# Patient Record
Sex: MALE | Race: BLACK OR AFRICAN AMERICAN | Employment: OTHER | URBAN - METROPOLITAN AREA
[De-identification: names, ages, dates, MRNs, and addresses within clinical notes are randomized per-mention and may not be internally consistent; named-entity substitution may affect disease eponyms.]

---

## 2017-07-07 DIAGNOSIS — I15.9 SECONDARY HYPERTENSION: ICD-10-CM

## 2017-07-07 RX ORDER — LISINOPRIL 20 MG/1
20 TABLET ORAL DAILY
Qty: 30 TAB | Refills: 1 | OUTPATIENT
Start: 2017-07-07

## 2017-07-11 RX ORDER — LISINOPRIL 20 MG/1
20 TABLET ORAL DAILY
Qty: 30 TAB | Refills: 1 | Status: CANCELLED | OUTPATIENT
Start: 2017-07-11

## 2017-07-11 NOTE — TELEPHONE ENCOUNTER
Verito Grady, wife, is requesting a refill for \"Lisinopril\" (mg unknown at this time). Pt is completely out, and uses the Ruba Perez on file.  Best contact [phone].              Copied/pasted from [facility]

## 2017-07-14 ENCOUNTER — TELEPHONE (OUTPATIENT)
Dept: INTERNAL MEDICINE CLINIC | Age: 57
End: 2017-07-14

## 2017-07-14 NOTE — TELEPHONE ENCOUNTER
the patient is no longer under my care    See LOV note    When I saw him in November (over 6 months ago) we discussed together that he would no longer be following with me d/t differences in philosophy of care.     I would continue scripts for 1 month , we are way past that point in time      I no longer prescribe for him

## 2017-07-14 NOTE — TELEPHONE ENCOUNTER
Spoke to Maritza Michael (hospitals). Romi Reveles states that pt is currently looking for another pcp but would like a refill on lisinopril. Romi Sat informed pt is way overdue and being that he is looking for another physician, more than likely no refill will be done. Romi Sat asking for a letter stating why be sent to pt's address. Romi Sat informed Dr. Elba Thakur will be notified. Romi Reveles verbalized understanding of information discussed w/ no further questions at this time.

## 2017-07-27 ENCOUNTER — OFFICE VISIT (OUTPATIENT)
Dept: INTERNAL MEDICINE CLINIC | Age: 57
End: 2017-07-27

## 2017-07-27 VITALS
DIASTOLIC BLOOD PRESSURE: 136 MMHG | TEMPERATURE: 99.1 F | BODY MASS INDEX: 34.33 KG/M2 | OXYGEN SATURATION: 98 % | SYSTOLIC BLOOD PRESSURE: 182 MMHG | WEIGHT: 239.8 LBS | HEART RATE: 97 BPM | HEIGHT: 70 IN

## 2017-07-27 DIAGNOSIS — K92.1 HEMATOCHEZIA: ICD-10-CM

## 2017-07-27 DIAGNOSIS — I15.9 SECONDARY HYPERTENSION: ICD-10-CM

## 2017-07-27 DIAGNOSIS — I10 ESSENTIAL HYPERTENSION: ICD-10-CM

## 2017-07-27 DIAGNOSIS — K59.00 CONSTIPATION, UNSPECIFIED CONSTIPATION TYPE: Primary | ICD-10-CM

## 2017-07-27 RX ORDER — HYDROCHLOROTHIAZIDE 25 MG/1
25 TABLET ORAL DAILY
Qty: 30 TAB | Refills: 1 | Status: SHIPPED | OUTPATIENT
Start: 2017-07-27 | End: 2020-06-15 | Stop reason: SDUPTHER

## 2017-07-27 RX ORDER — ECONAZOLE NITRATE 10 MG/G
CREAM TOPICAL 2 TIMES DAILY
Qty: 15 G | Refills: 0 | Status: SHIPPED | OUTPATIENT
Start: 2017-07-27 | End: 2020-06-15 | Stop reason: SDUPTHER

## 2017-07-27 RX ORDER — LISINOPRIL 20 MG/1
20 TABLET ORAL DAILY
Qty: 30 TAB | Refills: 1 | Status: SHIPPED | OUTPATIENT
Start: 2017-07-27 | End: 2019-05-15 | Stop reason: SDUPTHER

## 2017-07-27 NOTE — PROGRESS NOTES
HISTORY OF PRESENT ILLNESS  Royal Chavira is a 49 y.o. male. HPI   C/o constipation with hard small pebble like stool x 5 days  Senna tea not helping and fleets suppositry   C/o sharp pain in lower abdomen last few days  No rectal or bleeding this week but had large blood in rectum last few months. 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 30 Tab 1    lisinopril (PRINIVIL, ZESTRIL) 20 mg tablet Take 1 Tab by mouth daily. 30 Tab 1    econazole nitrate (SPECTAZOLE) 1 % topical cream Apply  to affected area two (2) times a day. 15 g 0    sildenafil citrate (VIAGRA) 100 mg tablet Take 1 Tab by mouth as needed. 6 Tab 0    hydrocortisone (ANUSOL-HC) 25 mg suppository Insert 1 Suppository into rectum two (2) times daily as needed for Hemorrhoids.  60 Each 4    Blood-Glucose Meter (FREESTYLE FREEDOM LITE) monitoring kit Check blood sugar daily 1 Kit 0    glucose blood VI test strips (FREESTYLE LITE STRIPS) strip Check blood sugar daily 25 Strip 0    chlorhexidine (HIBICLENS) 4 % liquid 3 times per week from neck down 1 Bottle 0    pravastatin (PRAVACHOL) 20 mg tablet Take 1 Tab by mouth nightly. 30 Tab 6    clotrimazole (LOTRIMIN) 1 % topical cream Apply  to affected area two (2) times a day. 45 g 2     Allergies   Allergen Reactions    Hydrocodone Nausea Only    Naprosyn [Naproxen] Nausea and Vomiting      Lab Results  Component Value Date/Time   WBC 7.6 05/08/2014 02:06 PM   HGB 13.6 05/08/2014 02:06 PM   HCT 38.7 05/08/2014 02:06 PM   PLATELET 552 52/40/4546 02:06 PM   MCV 87.6 05/08/2014 02:06 PM     Lab Results  Component Value Date/Time   GFR est non-AA 55 10/05/2015 03:26 PM   GFR est AA 64 10/05/2015 03:26 PM   Creatinine 1.44 10/05/2015 03:26 PM   BUN 12 10/05/2015 03:26 PM   Sodium 140 10/05/2015 03:26 PM   Potassium 4.4 10/05/2015 03:26 PM   Chloride 100 10/05/2015 03:26 PM   CO2 23 10/05/2015 03:26 PM        ROS    Physical Exam   Constitutional: He appears well-developed and well-nourished. No distress. Appears stated age   HENT:   Head: Normocephalic. Cardiovascular: Normal rate, regular rhythm and normal heart sounds. Exam reveals no gallop and no friction rub. No murmur heard. Pulmonary/Chest: Effort normal and breath sounds normal.   Abdominal: Soft. He exhibits no mass. There is no tenderness. There is no rebound and no guarding. Genitourinary: Prostate normal.   Genitourinary Comments: Smooth  1plus prostate, no rectal masses or stool in rectum palptated   Musculoskeletal: He exhibits no edema. Neurological: He is alert. Psychiatric: He has a normal mood and affect. Nursing note and vitals reviewed. ASSESSMENT and PLAN  Diagnoses and all orders for this visit:    1. Constipation, unspecified constipation type  -     REFERRAL TO GASTROENTEROLOGY  -     CBC W/O DIFF  -     METABOLIC PANEL, COMPREHENSIVE   miralax bid   Can use mag citrate if above ineffective    2. Hematochezia  -     REFERRAL TO GASTROENTEROLOGY  -     CBC W/O DIFF  -     METABOLIC PANEL, COMPREHENSIVE    3. Secondary hypertension  -     lisinopril (PRINIVIL, ZESTRIL) 20 mg tablet;  Take 1 Tab by mouth daily. Stressed medcomplinace   F/u PCP in 1-2 months    4. Essential hypertension    Other orders  -     hydroCHLOROthiazide (HYDRODIURIL) 25 mg tablet; Take 1 Tab by mouth daily. -     econazole nitrate (SPECTAZOLE) 1 % topical cream; Apply  to affected area two (2) times a day. Follow-up Disposition:  Return in about 2 months (around 9/27/2017) for Dr Rao Do HTN constipation.

## 2017-07-27 NOTE — MR AVS SNAPSHOT
Visit Information Date & Time Provider Department Dept. Phone Encounter #  
 7/27/2017  1:45 PM Giuliano Kaplan [facility] [address] [phone] [id number] Follow-up Instructions Return in about 2 months (around 9/27/2017) for Dr Mary Preston HTN constipation. Upcoming Health Maintenance Date Due DTaP/Tdap/Td series (1 - Tdap) 12/26/1983 INFLUENZA AGE 9 TO ADULT 8/1/2017 COLONOSCOPY 10/5/2025 Allergies as of 7/27/2017  Review Complete On: 7/27/2017 By: Ashley Orozco LPN Severity Noted Reaction Type Reactions Hydrocodone  03/21/2011    Nausea Only Naprosyn [Naproxen]  05/08/2014    Nausea and Vomiting Current Immunizations  Never Reviewed No immunizations on file. Not reviewed this visit You Were Diagnosed With   
  
 Codes Comments Constipation, unspecified constipation type    -  Primary ICD-10-CM: K59.00 ICD-9-CM: 564.00 Hematochezia     ICD-10-CM: K92.1 ICD-9-CM: 578.1 Secondary hypertension     ICD-10-CM: I15.9 ICD-9-CM: 405.99 Vitals BP Pulse Temp Height(growth percentile) Weight(growth percentile) SpO2  
 (!) 182/136 (BP 1 Location: Left arm, BP Patient Position: Sitting) 97 99.1 °F (37.3 °C) (Oral) 5' 10\" (1.778 m) 239 lb 12.8 oz (108.8 kg) 98% BMI Smoking Status 34.41 kg/m2 Former Smoker BMI and BSA Data Body Mass Index Body Surface Area 34.41 kg/m 2 2.32 m 2 Preferred Pharmacy Pharmacy Name Phone [facility] [address] [facility] [phone] Your Updated Medication List  
  
   
This list is accurate as of: 7/27/17  2:38 PM.  Always use your most recent med list.  
  
  
  
  
 Blood-Glucose Meter monitoring kit Commonly known as:  FREESTYLE FREEDOM LITE Check blood sugar daily  
  
 chlorhexidine 4 % liquid Commonly known as:  HIBICLENS  
3 times per week from neck down clotrimazole 1 % topical cream  
Commonly known as:  Geraline Chill Apply  to affected area two (2) times a day.  
  
 econazole nitrate 1 % topical cream  
Commonly known as:  Diania Lapping Apply  to affected area two (2) times a day. glucose blood VI test strips strip Commonly known as:  FREESTYLE LITE STRIPS Check blood sugar daily  
  
 hydroCHLOROthiazide 25 mg tablet Commonly known as:  HYDRODIURIL Take 1 Tab by mouth daily. hydrocortisone 25 mg Supp Commonly known as:  ANUSOL-HC Insert 1 Suppository into rectum two (2) times daily as needed for Hemorrhoids. lisinopril 20 mg tablet Commonly known as:  Tildon Kota Take 1 Tab by mouth daily. pravastatin 20 mg tablet Commonly known as:  PRAVACHOL Take 1 Tab by mouth nightly. sildenafil citrate 100 mg tablet Commonly known as:  VIAGRA Take 1 Tab by mouth as needed. Prescriptions Sent to Pharmacy Refills  
 hydroCHLOROthiazide (HYDRODIURIL) 25 mg tablet 1 Sig: Take 1 Tab by mouth daily. Class: Normal  
 Pharmacy: [facility] [address] [place] [address] Ph #: [phone] Route: Oral  
 lisinopril (PRINIVIL, ZESTRIL) 20 mg tablet 1 Sig: Take 1 Tab by mouth daily. Class: Normal  
 Pharmacy: [facility] [address] [place] [address] Ph #: [phone] Route: Oral  
 econazole nitrate (SPECTAZOLE) 1 % topical cream 0 Sig: Apply  to affected area two (2) times a day. Class: Normal  
 Pharmacy: [facility] [id number] [facility] Ph #: [phone] Route: Topical  
  
We Performed the Following CBC W/O DIFF [18324 CPT(R)] METABOLIC PANEL, COMPREHENSIVE [85007 CPT(R)] REFERRAL TO GASTROENTEROLOGY [OQR02 Custom] Comments:  
 Please evaluate patient for hematochezia Follow-up Instructions Return in about 2 months (around 9/27/2017) for Dr Tuyet Goetz HTN constipation. Referral Information Referral ID Referred By Referred To  
  
 [id number] KO, [address]   
   [address] [place], [address] Visits Status Start Date End Date 1 New Request 7/27/17 7/27/18 If your referral has a status of pending review or denied, additional information will be sent to support the outcome of this decision. Introducing [facility]! [employer] introduces Walk-in Appointment Scheduler patient portal. Now you can access parts of your medical record, email your doctor's office, and request medication refills online. 1. In your internet browser, go to [email or web address] 2. Click on the First Time User? Click Here link in the Sign In box. You will see the New Member Sign Up page. 3. Enter your Walk-in Appointment Scheduler Access Code exactly as it appears below. You will not need to use this code after youve completed the sign-up process. If you do not sign up before the expiration date, you must request a new code. · Walk-in Appointment Scheduler Access Code: [email or web address] Expires: 10/25/2017  2:38 PM 
 
4. Enter the last four digits of your Social Security Number (xxxx) and Date of Birth (mm/dd/yyyy) as indicated and click Submit. You will be taken to the next sign-up page. 5. Create a Walk-in Appointment Scheduler ID. This will be your Walk-in Appointment Scheduler login ID and cannot be changed, so think of one that is secure and easy to remember. 6. Create a Walk-in Appointment Scheduler password. You can change your password at any time. 7. Enter your Password Reset Question and Answer. This can be used at a later time if you forget your password. 8. Enter your e-mail address. You will receive e-mail notification when new information is available in [address] 9. Click Sign Up. You can now view and download portions of your medical record.  
10. Click the Download Summary menu link to download a portable copy of your medical information. If you have questions, please visit the Frequently Asked Questions section of the [employer] website. Remember, [employer] is NOT to be used for urgent needs. For medical emergencies, dial 911. Now available from your iPhone and Android! Please provide this summary of care documentation to your next provider. If you have any questions after today's visit, please call [phone].

## 2017-07-28 LAB
ALBUMIN SERPL-MCNC: 4.5 G/DL (ref 3.5–5.5)
ALBUMIN/GLOB SERPL: 1.3 {RATIO} (ref 1.2–2.2)
ALP SERPL-CCNC: 58 IU/L (ref 39–117)
ALT SERPL-CCNC: 19 IU/L (ref 0–44)
AST SERPL-CCNC: 20 IU/L (ref 0–40)
BILIRUB SERPL-MCNC: 0.5 MG/DL (ref 0–1.2)
BUN SERPL-MCNC: 12 MG/DL (ref 6–24)
BUN/CREAT SERPL: 7 (ref 9–20)
CALCIUM SERPL-MCNC: 9.8 MG/DL (ref 8.7–10.2)
CHLORIDE SERPL-SCNC: 95 MMOL/L (ref 96–106)
CO2 SERPL-SCNC: 20 MMOL/L (ref 18–29)
CREAT SERPL-MCNC: 1.61 MG/DL (ref 0.76–1.27)
ERYTHROCYTE [DISTWIDTH] IN BLOOD BY AUTOMATED COUNT: 15.3 % (ref 12.3–15.4)
GLOBULIN SER CALC-MCNC: 3.5 G/DL (ref 1.5–4.5)
GLUCOSE SERPL-MCNC: 117 MG/DL (ref 65–99)
HCT VFR BLD AUTO: 44.5 % (ref 37.5–51)
HGB BLD-MCNC: 15.3 G/DL (ref 12.6–17.7)
MCH RBC QN AUTO: 31.2 PG (ref 26.6–33)
MCHC RBC AUTO-ENTMCNC: 34.4 G/DL (ref 31.5–35.7)
MCV RBC AUTO: 91 FL (ref 79–97)
PLATELET # BLD AUTO: 273 X10E3/UL (ref 150–379)
POTASSIUM SERPL-SCNC: 3.7 MMOL/L (ref 3.5–5.2)
PROT SERPL-MCNC: 8 G/DL (ref 6–8.5)
RBC # BLD AUTO: 4.91 X10E6/UL (ref 4.14–5.8)
SODIUM SERPL-SCNC: 137 MMOL/L (ref 134–144)
WBC # BLD AUTO: 10.6 X10E3/UL (ref 3.4–10.8)

## 2017-07-28 NOTE — PROGRESS NOTES
Tell pt is cbc is wnl--not anemic, but should still see the GI MD for colonsocopy d/t rectal bleeding and abdominal pain. His kidney function in moderately impaired--CKD stage 3--likely has impaired kidney function due to HTN.  Refer pt to renal MD Dr Sonya Rosario for CKD and uncontrolled HTN

## 2017-07-31 NOTE — PROGRESS NOTES
Spoke with patient after 2 patient identifiers being note and advised that his cbc is wnl and that he is not anemic, but should still see the GI MD for colonsocopy d/t rectal bleeding and abdominal pain. Also advised that his kidney function in moderately impaired, which likely has to do with impaired kidney function due to HTN. Refer pt to renal MD Dr Sonya Rosario for CKD and uncontrolled HTN. Pt states that he would like to wait on referrals at this time. All per Dr. Ivan Rocha. Patient expressed understanding and has no further questions at this time.

## 2017-08-10 ENCOUNTER — TELEPHONE (OUTPATIENT)
Dept: INTERNAL MEDICINE CLINIC | Age: 57
End: 2017-08-10

## 2017-08-10 NOTE — TELEPHONE ENCOUNTER
Wife states there was to have been a suppository called into pharmacy and it wasn't. Please call into [facility] on file.

## 2017-08-11 NOTE — TELEPHONE ENCOUNTER
Unable to reach patient or wife. LVM to return call. Pt saw Dr. Saúl Collins on 7/27 for an acute visit. Per Dr. Samantha Colin note he recommended miralax bid & can use mag citrate if above ineffective. Referred to GI as well. Noted by Dr. Kishan Baird- at his [address] in 2016 they discussed pt finding new PCP as he was not cooperative with following Dr. Onel Christian follow up plan & compliance with medications. For this reason patient will need to re establish care with another MD in office or practice.  Please discuss the above with patient if/when he calls back.

## 2017-08-16 ENCOUNTER — TELEPHONE (OUTPATIENT)
Dept: INTERNAL MEDICINE CLINIC | Age: 57
End: 2017-08-16

## 2017-08-16 DIAGNOSIS — K64.9 HEMORRHOIDS, UNSPECIFIED HEMORRHOID TYPE: ICD-10-CM

## 2017-08-16 RX ORDER — HYDROCORTISONE ACETATE 25 MG/1
25 SUPPOSITORY RECTAL
Qty: 12 EACH | Refills: 0 | Status: SHIPPED | OUTPATIENT
Start: 2017-08-16 | End: 2020-06-15 | Stop reason: SDUPTHER

## 2017-08-16 NOTE — TELEPHONE ENCOUNTER
#[phone] pt saw Dr. Ursula Somers 2-3 wks ago and was to have called in a suppository and that has yet to be done. Please call this in and call Mrs. Ronie Apley at number given.

## 2017-11-15 RX ORDER — SILDENAFIL 100 MG/1
100 TABLET, FILM COATED ORAL AS NEEDED
Qty: 6 TAB | Refills: 0 | Status: CANCELLED | OUTPATIENT
Start: 2017-11-15

## 2017-11-15 NOTE — TELEPHONE ENCOUNTER
Patient states he needs a call to discuss getting a refill on the prescription requested, Sildenfil, but wants to get this in 20 mg if possible & wants to discuss his last appt. Patient also wants to discuss getting a letter from the office stating refill was not received from Christina Romano on his request fro this medication.  Please call to to discuss before filling prescription request. Thank you

## 2017-11-17 NOTE — TELEPHONE ENCOUNTER
Called, left vm for pt to return call to office. Needs o/v for acute visit for matter of refill. Not with Dr. Ghazala Collins per Dr. Ghazala Collins.

## 2017-11-20 RX ORDER — SILDENAFIL CITRATE 20 MG/1
TABLET ORAL
Qty: 40 TAB | Refills: 3 | Status: SHIPPED | OUTPATIENT
Start: 2017-11-20 | End: 2019-04-19 | Stop reason: SDUPTHER

## 2017-11-20 NOTE — TELEPHONE ENCOUNTER
#[phone] pt is requesting that Dr. Paula Ferreira fill the viagra with the after market 20 mg he states. Pt had a lot to say that I am not understanding about politics and Cong[employer] and not sure why. Pt is requesting this for the least expensive route. Please call pt if there are any problems or questions.

## 2017-11-29 ENCOUNTER — TELEPHONE (OUTPATIENT)
Dept: INTERNAL MEDICINE CLINIC | Age: 57
End: 2017-11-29

## 2017-11-29 NOTE — TELEPHONE ENCOUNTER
Vickie Valadez., [facility] [phone], stated pt wanted \"Diazepam 5mg\" filled at [facility], and Stefano Up also checking on the status of a prior authorization for \"Sildenafil\".        Message received & copied from [facility]

## 2017-11-30 NOTE — TELEPHONE ENCOUNTER
Spoke with pharmacy after two patient identifiers and advised that valium was not ordered by our MD, and that the PA for sildenafil had been done and denied

## 2019-04-19 RX ORDER — SILDENAFIL CITRATE 20 MG/1
TABLET ORAL
Qty: 40 TAB | Refills: 2 | Status: SHIPPED | OUTPATIENT
Start: 2019-04-19 | End: 2020-06-15 | Stop reason: SDUPTHER

## 2019-05-15 DIAGNOSIS — I15.9 SECONDARY HYPERTENSION: ICD-10-CM

## 2019-05-15 RX ORDER — LISINOPRIL 20 MG/1
TABLET ORAL
Qty: 30 TAB | Refills: 0 | Status: SHIPPED | OUTPATIENT
Start: 2019-05-15 | End: 2019-10-22 | Stop reason: SDUPTHER

## 2019-10-22 DIAGNOSIS — I15.9 SECONDARY HYPERTENSION: ICD-10-CM

## 2019-10-22 RX ORDER — LISINOPRIL 20 MG/1
TABLET ORAL
Qty: 30 TAB | Refills: 0 | Status: SHIPPED | OUTPATIENT
Start: 2019-10-22 | End: 2020-06-15 | Stop reason: SDUPTHER

## 2020-06-13 PROBLEM — N18.30 CKD (CHRONIC KIDNEY DISEASE) STAGE 3, GFR 30-59 ML/MIN (HCC): Status: ACTIVE | Noted: 2020-06-13

## 2020-06-13 NOTE — PROGRESS NOTES
HISTORY OF PRESENT ILLNESS  Neris Baker is a 64 y.o. male. HPI   Neris Baker is a 64 y.o. male being evaluated by a Virtual Visit (video visit) encounter to address concerns as mentioned above. A caregiver was present when appropriate. Due to this being a TeleHealth encounter (During [facility]- public health emergency), evaluation of the following organ systems was limited: Vitals/Constitutional/EENT/Resp/CV/GI//MS/Neuro/Skin/Heme-Lymph-Imm. Pursuant to the emergency declaration under the [facility], [facility] authority and the [employer] Act, this Virtual Visit was conducted with patient's (and/or legal guardian's) consent, to reduce the risk of exposure to COVID-19 and provide necessary medical care. Services were provided through a video synchronous discussion virtually to substitute for in-person encounter. --Amna Gabriel MD on 6/13/2020 at 4:04 PM    An electronic signature was used to authenticate this note.     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, cuts the grass  C/o intermittent dizziness, right leg cramps and left foot swelling  Took pravastatin in the past and made him feel bad and weak      Patient Active Problem List    Diagnosis Date Noted    CKD (chronic kidney disease) stage 3, GFR 30-59 ml/min ([facility] 06/13/2020    Essential hypertension 10/05/2015    Sciatica 10/05/2015    Noncompliance 07/10/2014    Hemorrhoid 05/18/2014    Sarcoidosis 05/18/2014    Sciatica of right side 05/18/2014     Class: Chronic    Xerosis of skin 05/18/2014    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 15 g 0    hydroCHLOROthiazide (HYDRODIURIL) 25 mg tablet Take 1 Tab by mouth daily. 90 Tab 1    hydrocortisone (ANUSOL-HC) 25 mg supp Insert 1 Suppository into rectum two (2) times daily as needed (rectal irritation). 12 Each 0    lisinopriL (PRINIVIL, ZESTRIL) 20 mg tablet Take 1 Tab by mouth daily. 90 Tab 1    sildenafil citrate (Viagra) 100 mg tablet Take 1 Tab by mouth as needed for Erectile Dysfunction. 6 Tab 0    sildenafiL, pulmonary hypertension, (REVATIO) 20 mg tablet TAKE 5 TABLETS 30 MINUTE PRIOR TO SEXUAL ACTIVITY, 5 TABLETS DAILY MAXIMUM. (PLAN LIMIT 3T/DAY) 40 Tab 2    chlorhexidine (HIBICLENS) 4 % liquid 3 times per week from neck down 1 Bottle 0    pravastatin (PRAVACHOL) 20 mg tablet Take 1 Tab by mouth nightly. 30 Tab 6    clotrimazole (LOTRIMIN) 1 % topical cream Apply  to affected area two (2) times a day.  45 g 2    Blood-Glucose Meter (FREESTYLE FREEDOM LITE) monitoring kit Check blood sugar daily 1 Kit 0    glucose blood VI test strips (FREESTYLE LITE STRIPS) strip Check blood sugar daily 25 Strip 0     Allergies   Allergen Reactions    Hydrocodone Nausea Only    Naprosyn [Naproxen] Nausea and Vomiting      Lab Results   Component Value Date/Time    WBC 10.6 07/27/2017 03:10 PM    HGB 15.3 07/27/2017 03:10 PM    HCT 44.5 07/27/2017 03:10 PM    PLATELET 554 35/50/0802 03:10 PM    MCV 91 07/27/2017 03:10 PM     Lab Results   Component Value Date/Time    Hemoglobin A1c 5.9 (H) 10/05/2015 03:26 PM    Glucose 117 (H) 07/27/2017 03:10 PM    Microalb/Creat ratio (ug/mg creat.) 6.9 10/05/2015 03:26 PM    LDL, calculated 201 (H) 10/05/2015 03:26 PM    Creatinine 1.61 (H) 07/27/2017 03:10 PM      Lab Results   Component Value Date/Time    Cholesterol, total 282 (H) 10/05/2015 03:26 PM    HDL Cholesterol 52 10/05/2015 03:26 PM    LDL, calculated 201 (H) 10/05/2015 03:26 PM    Triglyceride 146 10/05/2015 03:26 PM     Lab Results   Component Value Date/Time    GFR est non-AA 48 (L) 07/27/2017 03:10 PM    GFR est AA 55 (L) 07/27/2017 03:10 PM    Creatinine 1.61 (H) 07/27/2017 03:10 PM    BUN 12 07/27/2017 03:10 PM    Sodium 137 07/27/2017 03:10 PM    Potassium 3.7 07/27/2017 03:10 PM    Chloride 95 (L) 07/27/2017 03:10 PM    CO2 20 07/27/2017 03:10 PM        Review of Systems   Constitutional: Negative for chills, fever, malaise/fatigue and weight loss. Eyes: Negative for blurred vision and double vision. Respiratory: Negative for cough and shortness of breath. Cardiovascular: Negative for chest pain and palpitations. Gastrointestinal: Negative for abdominal pain, blood in stool, constipation, diarrhea, melena, nausea and vomiting. Genitourinary: Negative for dysuria, frequency, hematuria and urgency. Musculoskeletal: Negative for back pain, falls, joint pain and myalgias. Neurological: Negative for dizziness, tremors and headaches. Physical Exam  Constitutional:       Appearance: Normal appearance. Pulmonary:      Effort: Pulmonary effort is normal.   Neurological:      Mental Status: He is alert. ASSESSMENT and PLAN  Diagnoses and all orders for this visit:    1. Routine general medical examination at a health care facility  -     CBC W/O DIFF  -     METABOLIC PANEL, COMPREHENSIVE  -     LIPID PANEL  -     HEMOGLOBIN A1C WITH EAG  -     PSA W/ REFLX FREE PSA  -     OCCULT BLOOD IMMUNOASSAY,DIAGNOSTIC    2. Essential hypertension   Advised home bp monitoring   Needs to take bp medications  3.  Pure hypercholesterolemia  -     LIPID PANEL    4. CKD (chronic kidney disease) stage 3, GFR 30-59 ml/min ([facility])  -     METABOLIC PANEL, COMPREHENSIVE  -     REFERRAL TO NEPHROLOGY    5. Prediabetes  -     HEMOGLOBIN A1C WITH EAG    6. ED   Refilled viagra      7.  Right leg cramps   Check lytes     RTC 6 months CPE

## 2020-06-15 ENCOUNTER — VIRTUAL VISIT (OUTPATIENT)
Dept: INTERNAL MEDICINE CLINIC | Age: 60
End: 2020-06-15

## 2020-06-15 DIAGNOSIS — K64.9 HEMORRHOIDS, UNSPECIFIED HEMORRHOID TYPE: ICD-10-CM

## 2020-06-15 DIAGNOSIS — E78.00 PURE HYPERCHOLESTEROLEMIA: ICD-10-CM

## 2020-06-15 DIAGNOSIS — R73.03 PREDIABETES: ICD-10-CM

## 2020-06-15 DIAGNOSIS — I15.9 SECONDARY HYPERTENSION: ICD-10-CM

## 2020-06-15 DIAGNOSIS — I10 ESSENTIAL HYPERTENSION: ICD-10-CM

## 2020-06-15 DIAGNOSIS — N18.30 CKD (CHRONIC KIDNEY DISEASE) STAGE 3, GFR 30-59 ML/MIN (HCC): ICD-10-CM

## 2020-06-15 DIAGNOSIS — Z00.00 ROUTINE GENERAL MEDICAL EXAMINATION AT A HEALTH CARE FACILITY: Primary | ICD-10-CM

## 2020-06-15 RX ORDER — HYDROCORTISONE ACETATE 25 MG/1
25 SUPPOSITORY RECTAL
Qty: 12 EACH | Refills: 0 | Status: SHIPPED | OUTPATIENT
Start: 2020-06-15 | End: 2021-03-31 | Stop reason: SDUPTHER

## 2020-06-15 RX ORDER — LISINOPRIL 20 MG/1
20 TABLET ORAL DAILY
Qty: 90 TAB | Refills: 1 | Status: SHIPPED | OUTPATIENT
Start: 2020-06-15 | End: 2021-05-10 | Stop reason: SDUPTHER

## 2020-06-15 RX ORDER — HYDROCHLOROTHIAZIDE 25 MG/1
25 TABLET ORAL DAILY
Qty: 90 TAB | Refills: 1 | Status: SHIPPED | OUTPATIENT
Start: 2020-06-15 | End: 2021-03-31 | Stop reason: SDUPTHER

## 2020-06-15 RX ORDER — ECONAZOLE NITRATE 10 MG/G
CREAM TOPICAL 2 TIMES DAILY
Qty: 15 G | Refills: 0 | Status: SHIPPED | OUTPATIENT
Start: 2020-06-15 | End: 2021-03-31

## 2020-06-15 RX ORDER — SILDENAFIL CITRATE 20 MG/1
TABLET ORAL
Qty: 40 TAB | Refills: 2 | Status: SHIPPED | OUTPATIENT
Start: 2020-06-15 | End: 2021-03-31

## 2020-06-15 RX ORDER — SILDENAFIL 100 MG/1
100 TABLET, FILM COATED ORAL AS NEEDED
Qty: 6 TAB | Refills: 0 | Status: SHIPPED | OUTPATIENT
Start: 2020-06-15 | End: 2021-11-26 | Stop reason: SDUPTHER

## 2020-06-15 NOTE — PATIENT INSTRUCTIONS
Office Policies Phone calls/patient messages: Please allow up to 24 hours for someone in the office to contact you about your call or message. Be mindful your provider may be out of the office or your message may require further review. We encourage you to use [employer] for your messages as this is a faster, more efficient way to communicate with our office Medication Refills: 
         
Prescription medications require 48-72 business hours to process. We encourage you to use [employer] for your refills. For controlled medications: Please allow 72 business hours to process. Certain medications may require you to  a written prescription at our office. NO narcotic/controlled medications will be prescribed after 4pm Monday through Friday or on weekends Form/Paperwork Completion: 
         
Please note a $25 fee may incur for all paperwork for completed by our providers. We ask that you allow 7-10 business days. Pre-payment is due prior to picking up/faxing the completed form. You may also download your forms to [employer] to have your doctor print off. This is an established visit conducted via telemedicine. The patient has been instructed that this meets HIPAA criteria and acknowledges and agrees to this method of visitation.  
 
Malena Alvarado [place] 
[birth year] 
8:31 AM

## 2020-06-15 NOTE — TELEPHONE ENCOUNTER
PCP: Dannie Heath MD    Last appt: Visit date not found  Future Appointments   Date Time Provider Sarbjit Aldridge   6/15/2020  8:30 AM Dannie Heath MD [facility]       Requested Prescriptions     Pending Prescriptions Disp Refills    econazole nitrate (SPECTAZOLE) 1 % topical cream 15 g 0     Sig: Apply  to affected area two (2) times a day.  hydroCHLOROthiazide (HYDRODIURIL) 25 mg tablet 90 Tab 1     Sig: Take 1 Tab by mouth daily.  hydrocortisone (ANUSOL-HC) 25 mg supp 12 Each 0     Sig: Insert 1 Suppository into rectum two (2) times daily as needed.  lisinopriL (PRINIVIL, ZESTRIL) 20 mg tablet 90 Tab 1     Sig: Take 1 Tab by mouth daily.  sildenafil citrate (Viagra) 100 mg tablet 6 Tab 0     Sig: Take 1 Tab by mouth as needed.     sildenafiL, pulmonary hypertension, (REVATIO) 20 mg tablet 40 Tab 2     Sig: TAKE 5 TABLETS 30 MINUTE PRIOR TO SEXUAL ACTIVITY, 5 TABLETS DAILY MAXIMUM. (PLAN LIMIT 3T/DAY)

## 2021-03-31 ENCOUNTER — OFFICE VISIT (OUTPATIENT)
Dept: INTERNAL MEDICINE CLINIC | Age: 61
End: 2021-03-31
Payer: MEDICAID

## 2021-03-31 VITALS
BODY MASS INDEX: 34.07 KG/M2 | HEIGHT: 70 IN | TEMPERATURE: 98.2 F | RESPIRATION RATE: 18 BRPM | OXYGEN SATURATION: 98 % | WEIGHT: 238 LBS | SYSTOLIC BLOOD PRESSURE: 172 MMHG | DIASTOLIC BLOOD PRESSURE: 124 MMHG | HEART RATE: 89 BPM

## 2021-03-31 DIAGNOSIS — R73.09 ELEVATED GLUCOSE: ICD-10-CM

## 2021-03-31 DIAGNOSIS — Z76.89 ESTABLISHING CARE WITH NEW DOCTOR, ENCOUNTER FOR: Primary | ICD-10-CM

## 2021-03-31 DIAGNOSIS — K64.9 HEMORRHOIDS, UNSPECIFIED HEMORRHOID TYPE: ICD-10-CM

## 2021-03-31 DIAGNOSIS — N18.30 STAGE 3 CHRONIC KIDNEY DISEASE, UNSPECIFIED WHETHER STAGE 3A OR 3B CKD (HCC): ICD-10-CM

## 2021-03-31 DIAGNOSIS — K62.5 RECTAL BLEEDING: ICD-10-CM

## 2021-03-31 DIAGNOSIS — Z12.11 COLON CANCER SCREENING: ICD-10-CM

## 2021-03-31 DIAGNOSIS — I10 ESSENTIAL HYPERTENSION: ICD-10-CM

## 2021-03-31 DIAGNOSIS — R35.1 NOCTURIA: ICD-10-CM

## 2021-03-31 LAB
HBA1C MFR BLD HPLC: 5.8 %
HGB BLD-MCNC: 12.2 G/DL

## 2021-03-31 PROCEDURE — 81003 URINALYSIS AUTO W/O SCOPE: CPT | Performed by: NURSE PRACTITIONER

## 2021-03-31 PROCEDURE — 83036 HEMOGLOBIN GLYCOSYLATED A1C: CPT | Performed by: NURSE PRACTITIONER

## 2021-03-31 PROCEDURE — 85018 HEMOGLOBIN: CPT | Performed by: NURSE PRACTITIONER

## 2021-03-31 PROCEDURE — 99204 OFFICE O/P NEW MOD 45 MIN: CPT | Performed by: NURSE PRACTITIONER

## 2021-03-31 RX ORDER — DOCUSATE SODIUM 100 MG/1
100 CAPSULE, LIQUID FILLED ORAL
Qty: 60 CAP | Refills: 0 | Status: SHIPPED | OUTPATIENT
Start: 2021-03-31 | End: 2021-06-29

## 2021-03-31 RX ORDER — HYDROCORTISONE ACETATE 25 MG/1
25 SUPPOSITORY RECTAL
Qty: 12 EACH | Refills: 0 | Status: SHIPPED | OUTPATIENT
Start: 2021-03-31 | End: 2021-11-26 | Stop reason: SDUPTHER

## 2021-03-31 RX ORDER — HYDROCHLOROTHIAZIDE 25 MG/1
25 TABLET ORAL DAILY
Qty: 90 TAB | Refills: 1 | Status: SHIPPED | OUTPATIENT
Start: 2021-03-31 | End: 2021-08-16 | Stop reason: SDUPTHER

## 2021-03-31 NOTE — PROGRESS NOTES
Chief Complaint   Patient presents with    Establish Care    Back Pain     sciatica    Shoulder Pain     right shoulder, pt states he is unable to lift arm     ED Follow-up     [facility], vertigo     Rectal Bleeding     x 5-10 years        1. Have you been to the ER, urgent care clinic since your last visit? Hospitalized since your last visit? Yes When: 03/11/2021 [facility] Reason for visit: vertigo    2. Have you seen or consulted any other health care providers outside of the [address] [place] [address] since your last visit? Include any pap smears or colon screening.  No

## 2021-03-31 NOTE — PROGRESS NOTES
Subjective: (As above and below)     Chief Complaint   Patient presents with    [facility]    Back Pain     sciatica    Shoulder Pain     right shoulder, pt states he is unable to lift arm     ED Follow-up     [facility], vertigo     Rectal Bleeding     x 5-10 years      Daniele Arriola is a 64y.o. year old male [facility] last PCP 6 mo ago, he is here w/ wife Bailey Moore who assists him      Hypertension ROS: has been low of meds so has been spacing out and cutting in half, he states HCTZ makes him urinate a lot and lisinopril would make him dizzy at times  no TIAs, no chest pain on exertion, no dyspnea on exertion, no swelling of ankle  He has been taking both meds in the morning with little to no food      Rectal bleeding:   No recent colonoscopy, has blood streaked tissue w/ wiping. He admits to overwiping at times. Has used anusol which helps some  Infrequent constiaptob    R shoulder pain: x few years, has not had funez yet  Works in [address], lots of heavy work. Pain w/ abduction  No weakness or neuropathy    Low back pain: chronic, stable Denies saddle numbness, leg weakness, falls or bowel/bladder dysfunction. 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    Prediabetes 2014    Sarcoidosis 2014    Sarcoidosis, lung ([facility]     Sciatica of right side 2014    Xerosis of skin 2014      Social History     Socioeconomic History    Marital status:      Spouse name: Not on file    Number of children: Not on file    Years of education: Not on file    Highest education level: Not on file   Tobacco Use    Smoking status: Former Smoker     Quit date: 2010     Years since quittin.2    Smokeless tobacco: Never Used   Substance and Sexual Activity    Alcohol use: Yes    Drug use: Yes     Types: Prescription, OTC    Sexual activity: Yes     Partners: Male          Current Outpatient Medications   Medication Sig    docusate sodium (COLACE) 100 mg capsule Take 1 Cap by mouth two (2) times daily as needed for Constipation for up to 90 days.  hydrocortisone (ANUSOL-HC) 25 mg supp Insert 1 Suppository into rectum two (2) times daily as needed (rectal irritation).  hydroCHLOROthiazide (HYDRODIURIL) 25 mg tablet Take 1 Tab by mouth daily.  lisinopriL (PRINIVIL, ZESTRIL) 20 mg tablet Take 1 Tab by mouth daily.  sildenafil citrate (Viagra) 100 mg tablet Take 1 Tab by mouth as needed for Erectile Dysfunction.  Blood-Glucose Meter (FREESTYLE FREEDOM LITE) monitoring kit Check blood sugar daily    glucose blood VI test strips (FREESTYLE LITE STRIPS) strip Check blood sugar daily     No current facility-administered medications for this visit. Review of Systems:   Constitutional:    Negative for fever and chills, negative diaphoresis. HEENT:              Negative for neck pain and stiffness.   Eyes:                  Negative for visual disturbance, itching, redness or discharge. Respiratory:        Negative for cough and shortness of breath. Cardiovascular:  Negative for chest pain and palpitations. Gastrointestinal: Negative for nausea, vomiting, abdominal pain, diarrhea or constipation. Genitourinary:     Negative for dysuria and frequency. +voids 2-4 x per night  Musculoskeletal: Negative for falls, tenderness and swelling. Skin:                    Negative for rash, masses or lesions. Neurological:       Negative for dizzyness, seizure, loss of consciousness, weakness and numbness. Objective:     Vitals:    03/31/21 0757 03/31/21 0901   BP: (!) 182/111 (!) 172/124   Pulse: 86 89   Resp: 18    Temp: 98.2 °F (36.8 °C)    TempSrc: Temporal    SpO2: 98%    Weight: 238 lb (108 kg)    Height: 5' 10\" (1.778 m)        Results for orders placed or performed in visit on 03/31/21   AMB POC HEMOGLOBIN A1C   Result Value Ref Range    Hemoglobin A1c (POC) 5.8 %   AMB POC HEMOGLOBIN (HGB)   Result Value Ref Range    Hemoglobin (POC) 12.2 G/DL         Gen: Oriented to person, place and time and well-developed, well-nourished and in no distress. HEENT:    Head: normocephalic and atraumatic. Eyes:  EOM are normal. Pupils equal and round. Neck:  Normal range of motion. Neck supple. Cardiovascular: normal rate, regular rhythm and normal heart sounds. Pulmonary/Chest:  Effort normal and breath sounds normal.  No respiratory distress. No wheezes, no rales. Abdominal: soft, normal  bowel sounds. Musculoskeletal:  No edema, no tenderness. No calf tenderness or edema. Normal shoulder exam  Neurological:  Alert, oriented to person, place and time. Skin: skin is warm and dry. Assessment/ Plan:     Follow-up and Dispositions    · Return in about 3 weeks (around 4/21/2021) for nv bp check. 1. Establishing care with new doctor, encounter for  Fu w/ ENT as planned      2. Elevated glucose    - AMB POC HEMOGLOBIN A1C    3.  Rectal bleeding    - AMB POC HEMOGLOBIN (HGB)  - REFERRAL TO GASTROENTEROLOGY  - docusate sodium (COLACE) 100 mg capsule; Take 1 Cap by mouth two (2) times daily as needed for Constipation for up to 90 days. Dispense: 60 Cap; Refill: 0    4. Essential hypertension  Resume meds  recc taking lisinopril in the evening  Discussed importance w/ med adherence, can change if can't tolerate  - LIPID PANEL; Future  - CBC W/O DIFF; Future  - METABOLIC PANEL, COMPREHENSIVE; Future  - AMB POC URINALYSIS DIP STICK AUTO W/O MICRO    5. Stage 3 chronic kidney disease, unspecified whether stage 3a or 3b CKD      6. Colon cancer screening    - refERRAL TO GASTROENTEROLOGY    7. Hemorrhoids, unspecified hemorrhoid type    - hydrocortisone (ANUSOL-HC) 25 mg supp; Insert 1 Suppository into rectum two (2) times daily as needed (rectal irritation). Dispense: 12 Each; Refill: 0    8. Nocturia    - PSA, DIAGNOSTIC (PROSTATE SPECIFIC AG); Future        I have discussed the diagnosis with the patient and the intended plan as seen in the above orders. The patient has received an after-visit summary and questions were answered concerning future plans. Pt conveyed understanding of plan. Medication Side Effects and Warnings were discussed with patient: yes  Patient Labs were reviewed: yes  Patient Past Records were reviewed:  yes    Loanee Pierz.  Emiliano Morataya NP

## 2021-03-31 NOTE — PATIENT INSTRUCTIONS
DASH Diet: Care Instructions Your Care Instructions The DASH diet is an eating plan that can help lower your blood pressure. DASH stands for Dietary Approaches to Stop Hypertension. Hypertension is high blood pressure. The DASH diet focuses on eating foods that are high in calcium, potassium, and magnesium. These nutrients can lower blood pressure. The foods that are highest in these nutrients are fruits, vegetables, low-fat dairy products, nuts, seeds, and legumes. But taking calcium, potassium, and magnesium supplements instead of eating foods that are high in those nutrients does not have the same effect. The DASH diet also includes whole grains, fish, and poultry. The DASH diet is one of several lifestyle changes your doctor may recommend to lower your high blood pressure. Your doctor may also want you to decrease the amount of sodium in your diet. Lowering sodium while following the DASH diet can lower blood pressure even further than just the DASH diet alone. Follow-up care is a key part of your treatment and safety. Be sure to make and go to all appointments, and call your doctor if you are having problems. It's also a good idea to know your test results and keep a list of the medicines you take. How can you care for yourself at home? Following the DASH diet · Eat 4 to 5 servings of fruit each day. A serving is 1 medium-sized piece of fruit, ½ cup chopped or canned fruit, 1/4 cup dried fruit, or 4 ounces (½ cup) of fruit juice. Choose fruit more often than fruit juice. · Eat 4 to 5 servings of vegetables each day. A serving is 1 cup of lettuce or raw leafy vegetables, ½ cup of chopped or cooked vegetables, or 4 ounces (½ cup) of vegetable juice. Choose vegetables more often than vegetable juice. · Get 2 to 3 servings of low-fat and fat-free dairy each day. A serving is 8 ounces of milk, 1 cup of yogurt, or 1 ½ ounces of cheese. · Eat 6 to 8 servings of grains each day.  A serving is 1 slice of bread, 1 ounce of dry cereal, or ½ cup of cooked rice, pasta, or cooked cereal. Try to choose whole-grain products as much as possible. · Limit lean meat, poultry, and fish to 2 servings each day. A serving is 3 ounces, about the size of a deck of cards. · Eat 4 to 5 servings of nuts, seeds, and legumes (cooked dried beans, lentils, and split peas) each week. A serving is 1/3 cup of nuts, 2 tablespoons of seeds, or ½ cup of cooked beans or peas. · Limit fats and oils to 2 to 3 servings each day. A serving is 1 teaspoon of vegetable oil or 2 tablespoons of salad dressing. · Limit sweets and added sugars to 5 servings or less a week. A serving is 1 tablespoon jelly or jam, ½ cup sorbet, or 1 cup of lemonade. · Eat less than 2,300 milligrams (mg) of sodium a day. If you limit your sodium to 1,500 mg a day, you can lower your blood pressure even more. Tips for success · Start small. Do not try to make dramatic changes to your diet all at once. You might feel that you are missing out on your favorite foods and then be more likely to not follow the plan. Make small changes, and stick with them. Once those changes become habit, add a few more changes. · Try some of the following: ? Make it a goal to eat a fruit or vegetable at every meal and at snacks. This will make it easy to get the recommended amount of fruits and vegetables each day. ? Try yogurt topped with fruit and nuts for a snack or healthy dessert. ? Add lettuce, tomato, cucumber, and onion to sandwiches. ? Combine a ready-made pizza crust with low-fat mozzarella cheese and lots of vegetable toppings. Try using tomatoes, squash, spinach, broccoli, carrots, cauliflower, and onions. ? Have a variety of cut-up vegetables with a low-fat dip as an appetizer instead of chips and dip. ? Sprinkle sunflower seeds or chopped almonds over salads. Or try adding chopped walnuts or almonds to cooked vegetables.  
? Try some vegetarian meals using beans and peas. Add garbanzo or kidney beans to salads. Make burritos and tacos with mashed monique beans or black beans. Where can you learn more? Go to http://www.gray.com/ Enter J856 in the search box to learn more about \"DASH Diet: Care Instructions. \" Current as of: December 16, 2019               Content Version: 12.6 © 7662-8910 [employer]. Care instructions adapted under license by [employer] (which disclaims liability or warranty for this information). If you have questions about a medical condition or this instruction, always ask your healthcare professional. [address] any warranty or liability for your use of this information.

## 2021-04-01 LAB
BILIRUB UR QL STRIP: NEGATIVE
GLUCOSE UR-MCNC: NEGATIVE MG/DL
KETONES P FAST UR STRIP-MCNC: NEGATIVE MG/DL
PH UR STRIP: 5 [PH] (ref 4.6–8)
PROT UR QL STRIP: NEGATIVE
SP GR UR STRIP: 1.03 (ref 1–1.03)
UA UROBILINOGEN AMB POC: NORMAL (ref 0.2–1)
URINALYSIS CLARITY POC: CLEAR
URINALYSIS COLOR POC: YELLOW
URINE BLOOD POC: NEGATIVE
URINE LEUKOCYTES POC: NEGATIVE
URINE NITRITES POC: NEGATIVE

## 2021-04-05 ENCOUNTER — TELEPHONE (OUTPATIENT)
Dept: INTERNAL MEDICINE CLINIC | Age: 61
End: 2021-04-05

## 2021-04-05 RX ORDER — ATORVASTATIN CALCIUM 20 MG/1
20 TABLET, FILM COATED ORAL
Qty: 90 TAB | Refills: 0 | Status: SHIPPED | OUTPATIENT
Start: 2021-04-05 | End: 2021-07-05

## 2021-04-05 RX ORDER — IBUPROFEN 800 MG/1
800 TABLET ORAL
Qty: 60 TAB | Refills: 0 | Status: SHIPPED | OUTPATIENT
Start: 2021-04-05 | End: 2021-08-16 | Stop reason: ALTCHOICE

## 2021-04-05 NOTE — TELEPHONE ENCOUNTER
lisseth allen  Reviewed labs  He has pravastain on his allergy list  He only had nausea- took during the day before  He will try statin at bedtime  He asks for pain med for occ shoulder pain  (works in [address])  [address] has helped before

## 2021-05-05 ENCOUNTER — TELEPHONE (OUTPATIENT)
Dept: INTERNAL MEDICINE CLINIC | Age: 61
End: 2021-05-05

## 2021-05-10 DIAGNOSIS — I15.9 SECONDARY HYPERTENSION: ICD-10-CM

## 2021-05-10 NOTE — TELEPHONE ENCOUNTER
[phone]  Audie Fields, Wife  Verified on HIPAA dated 11/30/16      States pt needs a refill of :    lisinopriL (PRINIVIL, ZESTRIL) 20 mg tablet      States please send to:     [facility] [phone]        States that pt established with another provider temporarily when dr Kathia Carr schedule was full bc pt needed to be seen. States that he was told by that provider that this refill needs to come from Dr Troy Wynne. States they are also still with dr Troy Wynne. States please call to update on request for refill.

## 2021-05-11 RX ORDER — LISINOPRIL 20 MG/1
20 TABLET ORAL DAILY
Qty: 90 TAB | Refills: 1 | Status: SHIPPED | OUTPATIENT
Start: 2021-05-11 | End: 2021-08-16 | Stop reason: SDUPTHER

## 2021-05-11 NOTE — TELEPHONE ENCOUNTER
PCP: Efrem Dunham NP    Last appt: Visit date not found    No future appointments. Requested Prescriptions     Pending Prescriptions Disp Refills    lisinopriL (PRINIVIL, ZESTRIL) 20 mg tablet 90 Tab 1     Sig: Take 1 Tab by mouth daily.        Prior labs and Blood pressures:  BP Readings from Last 3 Encounters:   03/31/21 (!) 172/124   07/27/17 (!) 182/136   11/30/16 (!) 162/112     Lab Results   Component Value Date/Time    Sodium 140 03/31/2021 09:26 AM    Potassium 4.0 03/31/2021 09:26 AM    Chloride 110 (H) 03/31/2021 09:26 AM    CO2 22 03/31/2021 09:26 AM    Anion gap 8 03/31/2021 09:26 AM    Glucose 98 03/31/2021 09:26 AM    BUN 20 03/31/2021 09:26 AM    Creatinine 1.27 03/31/2021 09:26 AM    BUN/Creatinine ratio 16 03/31/2021 09:26 AM    GFR est AA >60 03/31/2021 09:26 AM    GFR est non-AA 58 (L) 03/31/2021 09:26 AM    Calcium 9.0 03/31/2021 09:26 AM     Lab Results   Component Value Date/Time    Hemoglobin A1c 5.9 (H) 10/05/2015 03:26 PM    Hemoglobin A1c (POC) 5.8 03/31/2021 08:28 AM     Lab Results   Component Value Date/Time    Cholesterol, total 276 (H) 03/31/2021 09:26 AM    HDL Cholesterol 57 03/31/2021 09:26 AM    LDL, calculated 202.8 (H) 03/31/2021 09:26 AM    VLDL, calculated 16.2 03/31/2021 09:26 AM    Triglyceride 81 03/31/2021 09:26 AM    CHOL/HDL Ratio 4.8 03/31/2021 09:26 AM     No results found for: YOGI Champagne    Lab Results   Component Value Date/Time    TSH 1.790 10/05/2015 03:26 PM

## 2021-07-05 RX ORDER — ATORVASTATIN CALCIUM 20 MG/1
TABLET, FILM COATED ORAL
Qty: 90 TABLET | Refills: 0 | Status: SHIPPED | OUTPATIENT
Start: 2021-07-05 | End: 2021-12-20

## 2021-08-16 ENCOUNTER — VIRTUAL VISIT (OUTPATIENT)
Dept: INTERNAL MEDICINE CLINIC | Age: 61
End: 2021-08-16
Payer: MEDICAID

## 2021-08-16 DIAGNOSIS — M79.641 RIGHT HAND PAIN: Primary | ICD-10-CM

## 2021-08-16 DIAGNOSIS — I15.9 SECONDARY HYPERTENSION: ICD-10-CM

## 2021-08-16 DIAGNOSIS — I10 ESSENTIAL HYPERTENSION: ICD-10-CM

## 2021-08-16 PROCEDURE — 99213 OFFICE O/P EST LOW 20 MIN: CPT | Performed by: NURSE PRACTITIONER

## 2021-08-16 RX ORDER — NAPROXEN 500 MG/1
500 TABLET ORAL
Qty: 60 TABLET | Refills: 0 | Status: SHIPPED | OUTPATIENT
Start: 2021-08-16 | End: 2021-09-14

## 2021-08-16 RX ORDER — LISINOPRIL 20 MG/1
20 TABLET ORAL DAILY
Qty: 90 TABLET | Refills: 1 | Status: SHIPPED | OUTPATIENT
Start: 2021-08-16 | End: 2021-11-15

## 2021-08-16 RX ORDER — HYDROCHLOROTHIAZIDE 25 MG/1
25 TABLET ORAL DAILY
Qty: 90 TABLET | Refills: 1 | Status: SHIPPED
Start: 2021-08-16 | End: 2022-01-11 | Stop reason: SINTOL

## 2021-08-16 NOTE — LETTER
NOTIFICATION RETURN TO WORK / SCHOOL    8/16/2021 10:03 AM    Mr. Chidi Grimaldo  [address] [facility] [address]      To Whom It May Concern:    Chidi Grimaldo is currently under the care of Josh Delatorre. He is being followed by right hand pain and back pain, this is causing some interference with his usual activities- typing, working. If there are questions or concerns please have the patient contact our office. Sincerely,      Manisha Prince.  John Garcia NP

## 2021-08-16 NOTE — PROGRESS NOTES
Kevin Wray is a 64 y.o. male who was seen by synchronous (real-time) audio-video technology on 8/16/2021 for Hand Swelling (right hand with pain [email or web address])        Assessment & Plan:   Diagnoses and all orders for this visit:    1. Right hand pain  -     XR HAND RT MIN 3 V; Future  -     naproxen (NAPROSYN) 500 mg tablet; Take 1 Tablet by mouth two (2) times daily as needed for Pain. With food instead of ibuprofen    2. Secondary hypertension  -     lisinopriL (PRINIVIL, ZESTRIL) 20 mg tablet; Take 1 Tablet by mouth daily. 3. Essential hypertension    Other orders  -     hydroCHLOROthiazide (HYDRODIURIL) 25 mg tablet; Take 1 Tablet by mouth daily. The complexity of medical decision making for this visit is moderate     Discussed need to resume BP meds and risks of untreated HTN including CVA, death      Subjective:       Prior to Admission medications    Medication Sig Start Date End Date Taking? Authorizing Provider   hydroCHLOROthiazide (HYDRODIURIL) 25 mg tablet Take 1 Tablet by mouth daily. 8/16/21  Yes Karely Randolph NP   lisinopriL (PRINIVIL, ZESTRIL) 20 mg tablet Take 1 Tablet by mouth daily. 8/16/21  Yes Karley Randolph NP   naproxen (NAPROSYN) 500 mg tablet Take 1 Tablet by mouth two (2) times daily as needed for Pain. With food instead of ibuprofen 8/16/21  Yes Karley Randolph NP   atorvastatin (LIPITOR) 20 mg tablet TAKE ONE TABLET BY MOUTH ONCE NIGHTLY FOR CHOLESTEROL 7/5/21  Yes Karley Randolph NP   hydrocortisone (ANUSOL-HC) 25 mg supp Insert 1 Suppository into rectum two (2) times daily as needed (rectal irritation). 3/31/21  Yes Karley Randolph NP   sildenafil citrate (Viagra) 100 mg tablet Take 1 Tab by mouth as needed for Erectile Dysfunction. 6/15/20  Yes Brittney Barbosa MD   lisinopriL (PRINIVIL, ZESTRIL) 20 mg tablet Take 1 Tab by mouth daily.  5/11/21 8/16/21  Karley Randolph NP   ibuprofen (MOTRIN) 800 mg tablet Take 1 Tab by mouth every eight (8) hours as needed for Pain. With food 4/5/21 8/16/21  Alex JAMIL NP   hydroCHLOROthiazide (HYDRODIURIL) 25 mg tablet Take 1 Tab by mouth daily. 3/31/21 8/16/21  Loan Moe NP   Blood-Glucose Meter (FREESTYLE FREEDOM LITE) monitoring kit Check blood sugar daily 10/5/15   Yoandy Fiore MD   glucose blood VI test strips (FREESTYLE LITE STRIPS) strip Check blood sugar daily 10/5/15   Yoandy Fiore MD         ROS   Hand swelling and pain:   No warmth, redness  Has full ROM but w/ pain melecio to wrist  ibup helps a little bit but night is worse  No inciting injury but he does a lot of yard work for his job  Requests a letter stating his is being worked up for hand pain for job  He wises to try naproxen instead of ibup    Hyperlipidemia/HTN: not taking meds regularly, does not check home BP    Home BP during visit- wrist cuff  180/104    Objective:   No flowsheet data found.      [INSTRUCTIONS:  \"[x]\" Indicates a positive item  \"[]\" Indicates a negative item  -- DELETE ALL ITEMS NOT EXAMINED]    Constitutional: [x] Appears well-developed and well-nourished [x] No apparent distress      [] Abnormal -     Mental status: [x] Alert and awake  [x] Oriented to person/place/time [x] Able to follow commands    [] Abnormal -     Eyes:   EOM    [x]  Normal    [] Abnormal -   Sclera  [x]  Normal    [] Abnormal -          Discharge [x]  None visible   [] Abnormal -     HENT: [x] Normocephalic, atraumatic  [] Abnormal -   [x] Mouth/Throat: Mucous membranes are moist    External Ears [x] Normal  [] Abnormal -    Neck: [x] No visualized mass [] Abnormal -     Pulmonary/Chest: [x] Respiratory effort normal   [x] No visualized signs of difficulty breathing or respiratory distress        [] Abnormal -      Musculoskeletal:   [x] Normal gait with no signs of ataxia         [x] Normal range of motion of neck        [] Abnormal -     Neurological:        [x] No Facial Asymmetry (Cranial nerve 7 motor function) (limited exam due to video visit)          [x] No gaze palsy        [] Abnormal -          Skin:        [x] No significant exanthematous lesions or discoloration noted on facial skin         [] Abnormal -            Psychiatric:       [x] Normal Affect [] Abnormal -        [x] No Hallucinations    Other pertinent observable physical exam findings:-        We discussed the expected course, resolution and complications of the diagnosis(es) in detail. Medication risks, benefits, costs, interactions, and alternatives were discussed as indicated. I advised him to contact the office if his condition worsens, changes or fails to improve as anticipated. He expressed understanding with the diagnosis(es) and plan. Vernie Goltz, was evaluated through a synchronous (real-time) audio-video encounter. The patient (or guardian if applicable) is aware that this is a billable service. Verbal consent to proceed has been obtained within the past 12 months. The visit was conducted pursuant to the emergency declaration under the [facility] [address] authority and the Tim Ruang[employer] and [employer] General Act. Patient identification was verified, and a caregiver was present when appropriate. The patient was located in a state where the provider was credentialed to provide care. Gina Miller NP

## 2021-08-16 NOTE — PROGRESS NOTES
Pt is here for   Chief Complaint   Patient presents with    Hand Swelling     right hand with pain [email or web address]     1. Have you been to the ER, urgent care clinic since your last visit? Hospitalized since your last visit? No    2. Have you seen or consulted any other health care providers outside of the [address] [place] [address] since your last visit? Include any pap smears or colon screening.  No    Pain level 10/10 right hand

## 2021-08-17 ENCOUNTER — HOSPITAL ENCOUNTER (OUTPATIENT)
Dept: GENERAL RADIOLOGY | Age: 61
Discharge: HOME | End: 2021-08-17
Payer: MEDICAID

## 2021-08-17 DIAGNOSIS — M79.641 RIGHT HAND PAIN: ICD-10-CM

## 2021-08-17 PROCEDURE — 73130 X-RAY EXAM OF HAND: CPT

## 2021-09-14 DIAGNOSIS — M79.641 RIGHT HAND PAIN: ICD-10-CM

## 2021-09-14 RX ORDER — NAPROXEN 500 MG/1
TABLET ORAL
Qty: 60 TABLET | Refills: 0 | Status: SHIPPED | OUTPATIENT
Start: 2021-09-14 | End: 2021-10-20

## 2021-10-20 DIAGNOSIS — M79.641 RIGHT HAND PAIN: ICD-10-CM

## 2021-10-20 RX ORDER — NAPROXEN 500 MG/1
TABLET ORAL
Qty: 60 TABLET | Refills: 0 | Status: SHIPPED | OUTPATIENT
Start: 2021-10-20 | End: 2022-01-11

## 2021-11-13 DIAGNOSIS — I15.9 SECONDARY HYPERTENSION: ICD-10-CM

## 2021-11-15 RX ORDER — LISINOPRIL 20 MG/1
TABLET ORAL
Qty: 90 TABLET | Refills: 1 | Status: SHIPPED | OUTPATIENT
Start: 2021-11-15 | End: 2022-01-11 | Stop reason: SDUPTHER

## 2021-11-26 ENCOUNTER — VIRTUAL VISIT (OUTPATIENT)
Dept: INTERNAL MEDICINE CLINIC | Age: 61
End: 2021-11-26
Payer: MEDICAID

## 2021-11-26 DIAGNOSIS — M47.26 OSTEOARTHRITIS OF SPINE WITH RADICULOPATHY, LUMBAR REGION: ICD-10-CM

## 2021-11-26 DIAGNOSIS — K64.9 HEMORRHOIDS, UNSPECIFIED HEMORRHOID TYPE: ICD-10-CM

## 2021-11-26 DIAGNOSIS — N52.9 ERECTILE DYSFUNCTION, UNSPECIFIED ERECTILE DYSFUNCTION TYPE: Primary | ICD-10-CM

## 2021-11-26 PROCEDURE — 99442 PR PHYS/QHP TELEPHONE EVALUATION 11-20 MIN: CPT | Performed by: NURSE PRACTITIONER

## 2021-11-26 RX ORDER — PREDNISONE 10 MG/1
10 TABLET ORAL SEE ADMIN INSTRUCTIONS
Qty: 21 TABLET | Refills: 0 | Status: SHIPPED | OUTPATIENT
Start: 2021-11-26 | End: 2022-01-11

## 2021-11-26 RX ORDER — HYDROCORTISONE ACETATE 25 MG/1
25 SUPPOSITORY RECTAL
Qty: 12 EACH | Refills: 0 | Status: SHIPPED | OUTPATIENT
Start: 2021-11-26 | End: 2022-01-11

## 2021-11-26 RX ORDER — SILDENAFIL 100 MG/1
100 TABLET, FILM COATED ORAL AS NEEDED
Qty: 12 TABLET | Refills: 1 | Status: SHIPPED
Start: 2021-11-26 | End: 2022-03-08

## 2021-11-26 NOTE — PROGRESS NOTES
Pt is here for   Chief Complaint   Patient presents with    Leg Pain     right leg, pt states that he thinks it sciatica, pt states that the leg is cold sometimes, states that he would like a steriod and something for pain. PHONE CALL [phone]    Erectile Dysfunction     pt states that the pain messes with his sex     Hemorrhoids     would like the suppositories for this. Pt states pain level is a 6/10 right leg    1. Have you been to the ER, urgent care clinic since your last visit? Hospitalized since your last visit? No    2. Have you seen or consulted any other health care providers outside of the [address] [place] [address] since your last visit? Include any pap smears or colon screening.  No

## 2021-11-26 NOTE — LETTER
NOTIFICATION RETURN TO WORK / SCHOOL    11/26/2021 8:40 AM    Mr. Kristen Cm  [address] [facility] [address]      To Whom It May Concern:    Kristen Cm is currently under the care of Josh Delatorre. He was seen through a virtual visit on 11/26/21 for leg pain, believed to be related to his arthritis. If there are questions or concerns please have the patient contact our office. Sincerely,      Kyra Radford.  Cheryl Lux NP

## 2021-11-26 NOTE — PROGRESS NOTES
Valentine Mckeon is a 64 y.o. male, evaluated via audio-only technology on 11/26/2021 for Leg Pain (right leg, pt states that he thinks it sciatica, pt states that the leg is cold sometimes, states that he would like a steriod and something for pain. PHONE CALL [phone]), Erectile Dysfunction (pt states that the pain messes with his sex ), and Hemorrhoids (would like the suppositories for this.)  . Assessment & Plan:   Diagnoses and all orders for this visit:    1. Erectile dysfunction, unspecified erectile dysfunction type  -     sildenafil citrate (Viagra) 100 mg tablet; Take 1 Tablet by mouth as needed for Erectile Dysfunction. 2. Hemorrhoids, unspecified hemorrhoid type  -     hydrocortisone (ANUSOL-HC) 25 mg supp; Insert 1 Suppository into rectum two (2) times daily as needed (rectal irritation). 3. Osteoarthritis of spine with radiculopathy, lumbar region  -     predniSONE (STERAPRED DS) 10 mg dose pack; Take 1 Tablet by mouth See Admin Instructions. See administration instruction per 10mg dose pack      The complexity of medical decision making for this visit is moderate     Discussed narcotics not the best tx for arthritis  Fu in person INI  12  Subjective:       Prior to Admission medications    Medication Sig Start Date End Date Taking? Authorizing Provider   hydrocortisone (ANUSOL-HC) 25 mg supp Insert 1 Suppository into rectum two (2) times daily as needed (rectal irritation). 11/26/21  Yes Michael Erps., NP   sildenafil citrate (Viagra) 100 mg tablet Take 1 Tablet by mouth as needed for Erectile Dysfunction. 11/26/21  Yes Michael Erps., NP   predniSONE (STERAPRED DS) 10 mg dose pack Take 1 Tablet by mouth See Admin Instructions.  See administration instruction per 10mg dose pack 11/26/21  Yes Michael Waggoner., NP   lisinopriL (PRINIVIL, ZESTRIL) 20 mg tablet TAKE ONE TABLET BY MOUTH DAILY 11/15/21   Kajal JAMIL, NP   naproxen (NAPROSYN) 500 mg tablet TAKE ONE TABLET BY MOUTH TWICE A DAY AS NEEDED FOR PAIN WITH FOOD **DISCONTINUE IBUPROFEN** 10/20/21   Sandia Estimable Z., NP   hydroCHLOROthiazide (HYDRODIURIL) 25 mg tablet Take 1 Tablet by mouth daily. 8/16/21   Luma Liu NP   atorvastatin (LIPITOR) 20 mg tablet TAKE ONE TABLET BY MOUTH ONCE NIGHTLY FOR CHOLESTEROL 7/5/21   Luma Liu NP   hydrocortisone (ANUSOL-HC) 25 mg supp Insert 1 Suppository into rectum two (2) times daily as needed (rectal irritation). 3/31/21 11/26/21  Luma Liu NP   sildenafil citrate (Viagra) 100 mg tablet Take 1 Tab by mouth as needed for Erectile Dysfunction. 6/15/20 11/26/21  Jannet Palacios MD   Blood-Glucose Meter (FREESTYLE FREEDOM LITE) monitoring kit Check blood sugar daily 10/5/15   Corina Ochoa MD   glucose blood VI test strips (FREESTYLE LITE STRIPS) strip Check blood sugar daily 10/5/15   Corina Ochoa MD     Patient Active Problem List   Diagnosis Code    Hypertension I10    ED (erectile dysfunction) N52.9    Hyperlipidemia E78.5    Depression F32. 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 He uses ibup or naproxen  Asks for prednisone and hydrocodone    No flowsheet data found.     Kristen Cm, who was evaluated through a patient-initiated, synchronous (real-time) audio only encounter, and/or her healthcare decision maker, is aware that it is a billable service, with coverage as determined by his insurance carrier. He provided verbal consent to proceed: Yes. He has not had a related appointment within my department in the past 7 days or scheduled within the next 24 hours. On this date 11/26/2021 I have spent 11 minutes reviewing previous notes, test results and face to face (virtual) with the patient discussing the diagnosis and importance of compliance with the treatment plan as well as documenting on the day of the visit. Gina Hendricks, NP

## 2021-12-02 ENCOUNTER — TELEPHONE (OUTPATIENT)
Dept: INTERNAL MEDICINE CLINIC | Age: 61
End: 2021-12-02

## 2021-12-02 NOTE — TELEPHONE ENCOUNTER
Pt's wife Ms. Cleaster Gilford called stating the anusol and slidenafil needs prior.   Her ph# [phone]

## 2021-12-20 RX ORDER — ATORVASTATIN CALCIUM 20 MG/1
TABLET, FILM COATED ORAL
Qty: 90 TABLET | Refills: 0 | Status: SHIPPED
Start: 2021-12-20 | End: 2022-03-08

## 2022-01-11 ENCOUNTER — OFFICE VISIT (OUTPATIENT)
Dept: INTERNAL MEDICINE CLINIC | Age: 62
End: 2022-01-11
Payer: MEDICAID

## 2022-01-11 ENCOUNTER — NURSE TRIAGE (OUTPATIENT)
Dept: OTHER | Facility: CLINIC | Age: 62
End: 2022-01-11

## 2022-01-11 VITALS
SYSTOLIC BLOOD PRESSURE: 178 MMHG | TEMPERATURE: 98.6 F | OXYGEN SATURATION: 99 % | DIASTOLIC BLOOD PRESSURE: 126 MMHG | HEART RATE: 86 BPM | RESPIRATION RATE: 18 BRPM | WEIGHT: 239 LBS | HEIGHT: 70 IN | BODY MASS INDEX: 34.22 KG/M2

## 2022-01-11 DIAGNOSIS — I10 ESSENTIAL HYPERTENSION: Primary | ICD-10-CM

## 2022-01-11 DIAGNOSIS — M79.641 PAIN OF RIGHT HAND: ICD-10-CM

## 2022-01-11 DIAGNOSIS — G89.29 CHRONIC MIDLINE LOW BACK PAIN, UNSPECIFIED WHETHER SCIATICA PRESENT: ICD-10-CM

## 2022-01-11 DIAGNOSIS — R35.1 NOCTURIA: ICD-10-CM

## 2022-01-11 DIAGNOSIS — M54.50 CHRONIC MIDLINE LOW BACK PAIN, UNSPECIFIED WHETHER SCIATICA PRESENT: ICD-10-CM

## 2022-01-11 DIAGNOSIS — E78.2 MIXED HYPERLIPIDEMIA: ICD-10-CM

## 2022-01-11 DIAGNOSIS — R73.09 ELEVATED GLUCOSE: ICD-10-CM

## 2022-01-11 PROCEDURE — 99214 OFFICE O/P EST MOD 30 MIN: CPT | Performed by: NURSE PRACTITIONER

## 2022-01-11 RX ORDER — IBUPROFEN 600 MG/1
600 TABLET ORAL
Qty: 60 TABLET | Refills: 0 | Status: SHIPPED | OUTPATIENT
Start: 2022-01-11 | End: 2022-02-01 | Stop reason: SDUPTHER

## 2022-01-11 RX ORDER — AMLODIPINE BESYLATE 10 MG/1
10 TABLET ORAL DAILY
Qty: 90 TABLET | Refills: 0 | Status: SHIPPED | OUTPATIENT
Start: 2022-01-11 | End: 2022-02-02 | Stop reason: SDUPTHER

## 2022-01-11 RX ORDER — LISINOPRIL 20 MG/1
20 TABLET ORAL DAILY
Qty: 90 TABLET | Refills: 1 | Status: SHIPPED | OUTPATIENT
Start: 2022-01-11 | End: 2022-02-01 | Stop reason: SDUPTHER

## 2022-01-11 NOTE — PROGRESS NOTES
Subjective: (As above and below)     Chief Complaint   Patient presents with    Hand Pain     right hand swelling, pt states swelling has gone down now but will return by the end of the day and pain is worse at night     Letter for School/Work     pt states letter needs to be specific and states he has sciatica     Request For New Medication     pt would like ibuprofen instead of naproxen     Daniele RIZVIKassy Minor is a 63y.o. year old male who presents for     Hypertension ROS:  no medication side effects noted, no TIAs, no chest pain on exertion, no dyspnea on exertion, no swelling of ankles  Reports med adherence, but did not take today  BP Readings from Last 3 Encounters:   22 (!) 178/126   21 (!) 172/124   17 (!) 182/136       Back pain: stable at the moment, ibup works better than naproxen    Hyperlipidemia: tolerating statin    r hand pain: pt reports joint swelling that comes and goes, worse at the end of the day  He is R handed, does gardening  ibup helps more than naproxen  Some neuropathy    Reviewed PmHx, RxHx, FmHx, SocHx, AllgHx and updated in chart.   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 ([address] 2014    Hemorrhoid 2014    Hypercholesterolemia     Hypertension     Noncompliance 7/10/2014    Paranoia querulans ([address] 2014    Prediabetes 2014    Sarcoidosis 2014    Sarcoidosis, lung ([address]     Sciatica of right side 2014    Xerosis of skin 2014      Social History     Socioeconomic History    Marital status:    Tobacco Use    Smoking status: Former Smoker     Quit date: 2010     Years since quittin.0    Smokeless tobacco: Never Used   Vaping Use    Vaping Use: Never used   Substance and Sexual Activity    Alcohol use: Yes    Drug use: Yes     Types: Prescription, OTC    Sexual activity: Yes     Partners: Male          Current Outpatient Medications   Medication Sig    sildenafil citrate (Viagra) 100 mg tablet Take 1 Tablet by mouth as needed for Erectile Dysfunction.  lisinopriL (PRINIVIL, ZESTRIL) 20 mg tablet TAKE ONE TABLET BY MOUTH DAILY    atorvastatin (LIPITOR) 20 mg tablet TAKE ONE TABLET BY MOUTH EVERY NIGHT AT BEDTIME (Patient not taking: Reported on 1/11/2022)    hydroCHLOROthiazide (HYDRODIURIL) 25 mg tablet Take 1 Tablet by mouth daily. (Patient not taking: Reported on 1/11/2022)    Blood-Glucose Meter (FREESTYLE FREEDOM LITE) monitoring kit Check blood sugar daily    glucose blood VI test strips (FREESTYLE LITE STRIPS) strip Check blood sugar daily     No current facility-administered medications for this visit. Review of Systems:   Constitutional:    Negative for fever and chills, negative diaphoresis. HEENT:              Negative for neck pain and stiffness. Eyes:                  Negative for visual disturbance, itching, redness or discharge. Respiratory:        Negative for cough and shortness of breath. Cardiovascular:  Negative for chest pain and palpitations. Gastrointestinal: Negative for nausea, vomiting, abdominal pain, diarrhea or constipation. Genitourinary:     Negative for dysuria and frequency. Musculoskeletal: Negative for falls, tenderness and swelling. Skin:                    Negative for rash, masses or lesions. Neurological:       Negative for dizzyness, seizure, loss of consciousness, weakness and numbness. Objective:     Vitals:    01/11/22 1430   BP: (!) 198/122   Pulse: 92   Resp: 18   Temp: 98.6 °F (37 °C)   TempSrc: Temporal   SpO2: 99%   Weight: 239 lb (108.4 kg)   Height: 5' 10\" (1.778 m)           Assessment/ Plan:       1.  Essential hypertension  Questionable adherence  Add amlodipine  - amLODIPine (NORVASC) 10 mg tablet; Take 1 Tablet by mouth daily. Dispense: 90 Tablet; Refill: 0  - lisinopriL (PRINIVIL, ZESTRIL) 20 mg tablet; Take 1 Tablet by mouth daily. Dispense: 90 Tablet; Refill: 1    2. Mixed hyperlipidemia  stain    3. Pain of right hand    - REFERRAL TO HAND SURGERY  - ibuprofen (MOTRIN) 600 mg tablet; Take 1 Tablet by mouth every eight (8) hours as needed for Pain. With food. No other nsaids  Dispense: 60 Tablet; Refill: 0      5. Chronic midline low back pain, unspecified whether sciatica present      6. Nocturia    - AMB POC URINALYSIS DIP STICK AUTO W/O MICRO        I have discussed the diagnosis with the patient and the intended plan as seen in the above orders. The patient has received an after-visit summary and questions were answered concerning future plans. Pt conveyed understanding of plan. Medication Side Effects and Warnings were discussed with patient: yes  Patient Labs were reviewed: yes  Patient Past Records were reviewed:  yes    Wilma Escobar.  Akila Escudero NP

## 2022-01-11 NOTE — PROGRESS NOTES
Chief Complaint   Patient presents with    Hand Pain     right hand swelling, pt states swelling has gone down now but will return by the end of the day and pain is worse at night     Letter for School/Work     pt states letter needs to be specific and states he has sciatica     Request For New Medication     pt would like ibuprofen instead of naproxen       1. Have you been to the ER, urgent care clinic since your last visit? Hospitalized since your last visit? No    2. Have you seen or consulted any other health care providers outside of the [address] [place] [address] since your last visit? Include any pap smears or colon screening.  No

## 2022-01-11 NOTE — LETTER
NOTIFICATION RETURN TO WORK / SCHOOL    1/11/2022 3:13 PM    Mr. Vani Neves  [address] [address]      To Whom It May Concern:    Vani Neves is currently under the care of Josh Delatorre. He was seen in the office today for Right hand pain. He has confirmed arthritis and possible ligament strain. He has been referred to a hand specialist    If there are questions or concerns please have the patient contact our office. Sincerely,      Blake Calvillo.  Roberth Pedraza, NP

## 2022-01-20 LAB
BUN SERPL-MCNC: 11 MG/DL (ref 6–24)
BUN/CREAT SERPL: 10 (ref 9–20)
CALCIUM SERPL-MCNC: 9.4 MG/DL (ref 8.7–10.2)
CHLORIDE SERPL-SCNC: 105 MMOL/L (ref 96–106)
CO2 SERPL-SCNC: 24 MMOL/L (ref 20–29)
CREAT SERPL-MCNC: 1.11 MG/DL (ref 0.76–1.27)
EST. AVERAGE GLUCOSE BLD GHB EST-MCNC: 120 MG/DL
GLUCOSE SERPL-MCNC: 88 MG/DL (ref 65–99)
HBA1C MFR BLD: 5.8 % (ref 4.8–5.6)
POTASSIUM SERPL-SCNC: 3.9 MMOL/L (ref 3.5–5.2)
SODIUM SERPL-SCNC: 142 MMOL/L (ref 134–144)
URATE SERPL-MCNC: 6.8 MG/DL (ref 3.8–8.4)

## 2022-02-01 DIAGNOSIS — M79.641 PAIN OF RIGHT HAND: ICD-10-CM

## 2022-02-01 DIAGNOSIS — I10 ESSENTIAL HYPERTENSION: ICD-10-CM

## 2022-02-01 RX ORDER — AMLODIPINE BESYLATE 10 MG/1
10 TABLET ORAL DAILY
Qty: 90 TABLET | Refills: 0 | Status: CANCELLED | OUTPATIENT
Start: 2022-02-01

## 2022-02-01 NOTE — TELEPHONE ENCOUNTER
Pt states he went to Dentist yesterday and was unable to have procedure done d/t BP being too high 227/137 and was told to follow up w/ PCP for medical clearance. Pt stated he doesn't usually take his BP meds but since BP was high he took 2 lisinopril last night and a couple today. Informed pt it is unsafe to double up on medication and why, pt verbalized understanding. Pt states he did not have Amlodipine- he did not know he was rx'd med. Pt would like new rx sent to updated [facility]. Pt will like to come in for medical clearance this Thursday, pt states if he waits any longer the pain will kill him. Pt has voiced that he has no desire to be in this world, he is miserable, and is ready to give up.  Pt states he does not believe in hurting himself but he is ready to go when god takes him

## 2022-02-01 NOTE — TELEPHONE ENCOUNTER
Patient is requesting a call back, he states that his BP is up due to tooth pain, he has taking 5 BP meds in the last 24hrs, and is taking over the amount of IBP, not sure what to do, he has been to [facility] with no help

## 2022-02-02 DIAGNOSIS — I10 ESSENTIAL HYPERTENSION: ICD-10-CM

## 2022-02-02 RX ORDER — IBUPROFEN 600 MG/1
600 TABLET ORAL
Qty: 60 TABLET | Refills: 0 | Status: SHIPPED
Start: 2022-02-02 | End: 2022-03-08

## 2022-02-02 RX ORDER — AMLODIPINE BESYLATE 10 MG/1
10 TABLET ORAL DAILY
Qty: 90 TABLET | Refills: 0 | Status: SHIPPED | OUTPATIENT
Start: 2022-02-02 | End: 2022-05-26 | Stop reason: SDUPTHER

## 2022-02-02 RX ORDER — LISINOPRIL 20 MG/1
20 TABLET ORAL DAILY
Qty: 90 TABLET | Refills: 1 | Status: SHIPPED
Start: 2022-02-02 | End: 2022-03-08

## 2022-02-03 ENCOUNTER — CLINICAL SUPPORT (OUTPATIENT)
Dept: INTERNAL MEDICINE CLINIC | Age: 62
End: 2022-02-03

## 2022-02-03 VITALS
WEIGHT: 238 LBS | RESPIRATION RATE: 18 BRPM | SYSTOLIC BLOOD PRESSURE: 194 MMHG | HEIGHT: 70 IN | HEART RATE: 93 BPM | DIASTOLIC BLOOD PRESSURE: 130 MMHG | TEMPERATURE: 97.5 F | BODY MASS INDEX: 34.07 KG/M2 | OXYGEN SATURATION: 100 %

## 2022-02-03 DIAGNOSIS — Z01.30 BP CHECK: Primary | ICD-10-CM

## 2022-02-03 NOTE — PROGRESS NOTES
Chief Complaint   Patient presents with    Blood Pressure Check       Visit Vitals  BP (!) 194/130 (BP 1 Location: Left arm, BP Patient Position: Sitting, BP Cuff Size: Thigh)   Pulse 93   Temp 97.5 °F (36.4 °C) (Temporal)   Resp 18   Ht 5' 10\" (1.778 m)   Wt 238 lb (108 kg)   SpO2 100%   BMI 34.15 kg/m²         Pt has not taken Amlodipine, pt will  med and take both BP meds daily  And monitor BP at home.  F/u for dental clearance

## 2022-02-14 ENCOUNTER — TELEPHONE (OUTPATIENT)
Dept: INTERNAL MEDICINE CLINIC | Age: 62
End: 2022-02-14

## 2022-02-14 NOTE — TELEPHONE ENCOUNTER
----- Message from Negin Schmitt sent at 2/11/2022  3:48 PM EST -----  Subject: Message to Provider    QUESTIONS  Information for Provider? PATIENT STATES THAT HE REQUESTED A REFERRAL FOR   A DENTIST TO HAVE HIS TEETH PULLED, HE SAID HE REQUESTED IT A COUPLE OF   WEEKS AGO AND HAS NOT HEARD ANYTHING. PLEASE CALL PATIENT AS HE IS IN A   LOT OF PAIN.   ---------------------------------------------------------------------------  --------------  CALL BACK INFO  What is the best way for the office to contact you? OK to leave message on   voicemail  Preferred Call Back Phone Number? [phone]  ---------------------------------------------------------------------------  --------------  SCRIPT ANSWERS  Relationship to Patient?  Self

## 2022-02-16 NOTE — TELEPHONE ENCOUNTER
Patient is calling regarding the form from [facility]. He is wanting to know when it will be filled out. The form has to do with his blood pressure. The contact number is [phone].

## 2022-02-21 ENCOUNTER — NURSE TRIAGE (OUTPATIENT)
Dept: OTHER | Facility: CLINIC | Age: 62
End: 2022-02-21

## 2022-02-21 NOTE — TELEPHONE ENCOUNTER
Received call from [address] at [facility] with Red Flag Complaint. Caller not on line, returned call to pt. Subjective: Caller asking if he had an appt. And wants to cancel appt. States B/P has been high and took extra B/P med. Needs to have a tooth pulled and the dentist will not pull it if his B/P is too high. Pt. Talking fast, flight of ideas,inappropriate conversation. Refused recommendation for dispo,needs to be seen today,advised delaying treatment could worsen outcome including death,pt then disconnected call abruptly. Current Symptoms: B/P 155/11 today     Outcome: Unable to complete triage,due to caller disconnected call. Placed call to [facility]. Spoke to Destini Gardner, requested Well check to pt address. Attention Provider: Thank you for allowing me to participate in the care of your patient. The patient was connected to triage in response to information provided to the [facility]. Please do not respond through this encounter as the response is not directed to a shared pool.     Reason for Disposition   Caller hangs up     Caller making indirect reference to violence    Protocols used: DIFFICULT CALL-ADULT-

## 2022-03-06 ENCOUNTER — HOSPITAL ENCOUNTER (INPATIENT)
Age: 62
LOS: 2 days | Discharge: HOME | DRG: 174 | End: 2022-03-08
Attending: STUDENT IN AN ORGANIZED HEALTH CARE EDUCATION/TRAINING PROGRAM | Admitting: INTERNAL MEDICINE
Payer: MEDICAID

## 2022-03-06 ENCOUNTER — APPOINTMENT (OUTPATIENT)
Dept: GENERAL RADIOLOGY | Age: 62
DRG: 174 | End: 2022-03-06
Attending: EMERGENCY MEDICINE
Payer: MEDICAID

## 2022-03-06 DIAGNOSIS — I21.4 NSTEMI (NON-ST ELEVATED MYOCARDIAL INFARCTION) (HCC): Primary | ICD-10-CM

## 2022-03-06 LAB
ACT BLD: 243 SECS (ref 79–138)
ACT BLD: 261 SECS (ref 79–138)
ACT BLD: 279 SECS (ref 79–138)
ALBUMIN SERPL-MCNC: 3.8 G/DL (ref 3.5–5)
ALBUMIN/GLOB SERPL: 1 {RATIO} (ref 1.1–2.2)
ALP SERPL-CCNC: 48 U/L (ref 45–117)
ALT SERPL-CCNC: 33 U/L (ref 12–78)
ANION GAP SERPL CALC-SCNC: 7 MMOL/L (ref 5–15)
APTT PPP: 35.9 SEC (ref 22.1–31)
AST SERPL-CCNC: 187 U/L (ref 15–37)
BASOPHILS # BLD: 0 K/UL (ref 0–0.1)
BASOPHILS NFR BLD: 0 % (ref 0–1)
BILIRUB SERPL-MCNC: 0.5 MG/DL (ref 0.2–1)
BNP SERPL-MCNC: 1514 PG/ML
BUN SERPL-MCNC: 14 MG/DL (ref 6–20)
BUN/CREAT SERPL: 11 (ref 12–20)
CALCIUM SERPL-MCNC: 8.8 MG/DL (ref 8.5–10.1)
CHLORIDE SERPL-SCNC: 103 MMOL/L (ref 97–108)
CO2 SERPL-SCNC: 28 MMOL/L (ref 21–32)
CREAT SERPL-MCNC: 1.3 MG/DL (ref 0.7–1.3)
DIFFERENTIAL METHOD BLD: ABNORMAL
EOSINOPHIL # BLD: 0 K/UL (ref 0–0.4)
EOSINOPHIL NFR BLD: 0 % (ref 0–7)
ERYTHROCYTE [DISTWIDTH] IN BLOOD BY AUTOMATED COUNT: 13.1 % (ref 11.5–14.5)
GLOBULIN SER CALC-MCNC: 3.9 G/DL (ref 2–4)
GLUCOSE SERPL-MCNC: 153 MG/DL (ref 65–100)
HCT VFR BLD AUTO: 39.7 % (ref 36.6–50.3)
HGB BLD-MCNC: 13.4 G/DL (ref 12.1–17)
IMM GRANULOCYTES # BLD AUTO: 0 K/UL (ref 0–0.04)
IMM GRANULOCYTES NFR BLD AUTO: 0 % (ref 0–0.5)
LYMPHOCYTES # BLD: 1.7 K/UL (ref 0.8–3.5)
LYMPHOCYTES NFR BLD: 14 % (ref 12–49)
MCH RBC QN AUTO: 30.9 PG (ref 26–34)
MCHC RBC AUTO-ENTMCNC: 33.8 G/DL (ref 30–36.5)
MCV RBC AUTO: 91.5 FL (ref 80–99)
MONOCYTES # BLD: 0.7 K/UL (ref 0–1)
MONOCYTES NFR BLD: 6 % (ref 5–13)
NEUTS SEG # BLD: 9.7 K/UL (ref 1.8–8)
NEUTS SEG NFR BLD: 80 % (ref 32–75)
NRBC # BLD: 0 K/UL (ref 0–0.01)
NRBC BLD-RTO: 0 PER 100 WBC
PLATELET # BLD AUTO: 230 K/UL (ref 150–400)
PMV BLD AUTO: 9.9 FL (ref 8.9–12.9)
POTASSIUM SERPL-SCNC: 3.1 MMOL/L (ref 3.5–5.1)
PROT SERPL-MCNC: 7.7 G/DL (ref 6.4–8.2)
RBC # BLD AUTO: 4.34 M/UL (ref 4.1–5.7)
SODIUM SERPL-SCNC: 138 MMOL/L (ref 136–145)
THERAPEUTIC RANGE,PTTT: ABNORMAL SECS (ref 58–77)
TROPONIN-HIGH SENSITIVITY: ABNORMAL NG/L (ref 0–76)
WBC # BLD AUTO: 12.2 K/UL (ref 4.1–11.1)

## 2022-03-06 PROCEDURE — C1725 CATH, TRANSLUMIN NON-LASER: HCPCS | Performed by: STUDENT IN AN ORGANIZED HEALTH CARE EDUCATION/TRAINING PROGRAM

## 2022-03-06 PROCEDURE — 71045 X-RAY EXAM CHEST 1 VIEW: CPT

## 2022-03-06 PROCEDURE — 74011000636 HC RX REV CODE- 636: Performed by: STUDENT IN AN ORGANIZED HEALTH CARE EDUCATION/TRAINING PROGRAM

## 2022-03-06 PROCEDURE — 99152 MOD SED SAME PHYS/QHP 5/>YRS: CPT | Performed by: STUDENT IN AN ORGANIZED HEALTH CARE EDUCATION/TRAINING PROGRAM

## 2022-03-06 PROCEDURE — 85347 COAGULATION TIME ACTIVATED: CPT

## 2022-03-06 PROCEDURE — 74011250636 HC RX REV CODE- 250/636: Performed by: STUDENT IN AN ORGANIZED HEALTH CARE EDUCATION/TRAINING PROGRAM

## 2022-03-06 PROCEDURE — 77030015766: Performed by: STUDENT IN AN ORGANIZED HEALTH CARE EDUCATION/TRAINING PROGRAM

## 2022-03-06 PROCEDURE — 74011000250 HC RX REV CODE- 250: Performed by: INTERNAL MEDICINE

## 2022-03-06 PROCEDURE — C1874 STENT, COATED/COV W/DEL SYS: HCPCS | Performed by: STUDENT IN AN ORGANIZED HEALTH CARE EDUCATION/TRAINING PROGRAM

## 2022-03-06 PROCEDURE — 77030019697 HC SYR ANGI INFL MRTM -B: Performed by: STUDENT IN AN ORGANIZED HEALTH CARE EDUCATION/TRAINING PROGRAM

## 2022-03-06 PROCEDURE — C1894 INTRO/SHEATH, NON-LASER: HCPCS | Performed by: STUDENT IN AN ORGANIZED HEALTH CARE EDUCATION/TRAINING PROGRAM

## 2022-03-06 PROCEDURE — C1769 GUIDE WIRE: HCPCS | Performed by: STUDENT IN AN ORGANIZED HEALTH CARE EDUCATION/TRAINING PROGRAM

## 2022-03-06 PROCEDURE — 77030010221 HC SPLNT WR POS TELE -B: Performed by: STUDENT IN AN ORGANIZED HEALTH CARE EDUCATION/TRAINING PROGRAM

## 2022-03-06 PROCEDURE — 77030008543 HC TBNG MON PRSS MRTM -A: Performed by: STUDENT IN AN ORGANIZED HEALTH CARE EDUCATION/TRAINING PROGRAM

## 2022-03-06 PROCEDURE — 99153 MOD SED SAME PHYS/QHP EA: CPT | Performed by: STUDENT IN AN ORGANIZED HEALTH CARE EDUCATION/TRAINING PROGRAM

## 2022-03-06 PROCEDURE — 99285 EMERGENCY DEPT VISIT HI MDM: CPT

## 2022-03-06 PROCEDURE — 74011250637 HC RX REV CODE- 250/637: Performed by: STUDENT IN AN ORGANIZED HEALTH CARE EDUCATION/TRAINING PROGRAM

## 2022-03-06 PROCEDURE — 93458 L HRT ARTERY/VENTRICLE ANGIO: CPT | Performed by: STUDENT IN AN ORGANIZED HEALTH CARE EDUCATION/TRAINING PROGRAM

## 2022-03-06 PROCEDURE — C1887 CATHETER, GUIDING: HCPCS | Performed by: STUDENT IN AN ORGANIZED HEALTH CARE EDUCATION/TRAINING PROGRAM

## 2022-03-06 PROCEDURE — 65620000000 HC RM CCU GENERAL

## 2022-03-06 PROCEDURE — 85730 THROMBOPLASTIN TIME PARTIAL: CPT

## 2022-03-06 PROCEDURE — 85025 COMPLETE CBC W/AUTO DIFF WBC: CPT

## 2022-03-06 PROCEDURE — 93005 ELECTROCARDIOGRAM TRACING: CPT

## 2022-03-06 PROCEDURE — 77030042317 HC BND COMPR HEMSTAT -B: Performed by: STUDENT IN AN ORGANIZED HEALTH CARE EDUCATION/TRAINING PROGRAM

## 2022-03-06 PROCEDURE — 36415 COLL VENOUS BLD VENIPUNCTURE: CPT

## 2022-03-06 PROCEDURE — 80053 COMPREHEN METABOLIC PANEL: CPT

## 2022-03-06 PROCEDURE — 77030004549 HC CATH ANGI DX PRF MRTM -A: Performed by: STUDENT IN AN ORGANIZED HEALTH CARE EDUCATION/TRAINING PROGRAM

## 2022-03-06 PROCEDURE — 77030040934 HC CATH DIAG DXTERITY MEDT -A: Performed by: STUDENT IN AN ORGANIZED HEALTH CARE EDUCATION/TRAINING PROGRAM

## 2022-03-06 PROCEDURE — 74011000250 HC RX REV CODE- 250: Performed by: STUDENT IN AN ORGANIZED HEALTH CARE EDUCATION/TRAINING PROGRAM

## 2022-03-06 PROCEDURE — 84484 ASSAY OF TROPONIN QUANT: CPT

## 2022-03-06 PROCEDURE — 92928 PRQ TCAT PLMT NTRAC ST 1 LES: CPT | Performed by: STUDENT IN AN ORGANIZED HEALTH CARE EDUCATION/TRAINING PROGRAM

## 2022-03-06 PROCEDURE — 4A023N7 MEASUREMENT OF CARDIAC SAMPLING AND PRESSURE, LEFT HEART, PERCUTANEOUS APPROACH: ICD-10-PCS | Performed by: STUDENT IN AN ORGANIZED HEALTH CARE EDUCATION/TRAINING PROGRAM

## 2022-03-06 PROCEDURE — 027035Z DILATION OF CORONARY ARTERY, ONE ARTERY WITH TWO DRUG-ELUTING INTRALUMINAL DEVICES, PERCUTANEOUS APPROACH: ICD-10-PCS | Performed by: STUDENT IN AN ORGANIZED HEALTH CARE EDUCATION/TRAINING PROGRAM

## 2022-03-06 PROCEDURE — 77030018729 HC ELECTRD DEFIB PAD CARD -B: Performed by: STUDENT IN AN ORGANIZED HEALTH CARE EDUCATION/TRAINING PROGRAM

## 2022-03-06 PROCEDURE — 77030012468 HC VLV BLEEDBK CNTRL ABBT -B: Performed by: STUDENT IN AN ORGANIZED HEALTH CARE EDUCATION/TRAINING PROGRAM

## 2022-03-06 PROCEDURE — 83880 ASSAY OF NATRIURETIC PEPTIDE: CPT

## 2022-03-06 PROCEDURE — 74011250637 HC RX REV CODE- 250/637: Performed by: INTERNAL MEDICINE

## 2022-03-06 PROCEDURE — B2111ZZ FLUOROSCOPY OF MULTIPLE CORONARY ARTERIES USING LOW OSMOLAR CONTRAST: ICD-10-PCS | Performed by: STUDENT IN AN ORGANIZED HEALTH CARE EDUCATION/TRAINING PROGRAM

## 2022-03-06 DEVICE — STENT RONYX40018UX RESOLUTE ONYX 4.00X18
Type: IMPLANTABLE DEVICE | Status: FUNCTIONAL
Brand: RESOLUTE ONYX™

## 2022-03-06 DEVICE — STENT RONYX27538UX RESOLUTE ONYX 2.75X38
Type: IMPLANTABLE DEVICE | Status: FUNCTIONAL
Brand: RESOLUTE ONYX™

## 2022-03-06 RX ORDER — VERAPAMIL HYDROCHLORIDE 2.5 MG/ML
INJECTION, SOLUTION INTRAVENOUS AS NEEDED
Status: DISCONTINUED | OUTPATIENT
Start: 2022-03-06 | End: 2022-03-06 | Stop reason: HOSPADM

## 2022-03-06 RX ORDER — ACETAMINOPHEN 650 MG/1
650 SUPPOSITORY RECTAL
Status: DISCONTINUED | OUTPATIENT
Start: 2022-03-06 | End: 2022-03-08 | Stop reason: HOSPADM

## 2022-03-06 RX ORDER — ONDANSETRON 2 MG/ML
4 INJECTION INTRAMUSCULAR; INTRAVENOUS
Status: DISCONTINUED | OUTPATIENT
Start: 2022-03-06 | End: 2022-03-08 | Stop reason: HOSPADM

## 2022-03-06 RX ORDER — ROSUVASTATIN CALCIUM 10 MG/1
5 TABLET, COATED ORAL
Status: DISCONTINUED | OUTPATIENT
Start: 2022-03-06 | End: 2022-03-08 | Stop reason: HOSPADM

## 2022-03-06 RX ORDER — METOPROLOL TARTRATE 25 MG/1
12.5 TABLET, FILM COATED ORAL EVERY 12 HOURS
Status: DISCONTINUED | OUTPATIENT
Start: 2022-03-06 | End: 2022-03-07

## 2022-03-06 RX ORDER — HEPARIN SODIUM 200 [USP'U]/100ML
INJECTION, SOLUTION INTRAVENOUS
Status: COMPLETED | OUTPATIENT
Start: 2022-03-06 | End: 2022-03-06

## 2022-03-06 RX ORDER — LIDOCAINE HYDROCHLORIDE 10 MG/ML
INJECTION, SOLUTION EPIDURAL; INFILTRATION; INTRACAUDAL; PERINEURAL AS NEEDED
Status: DISCONTINUED | OUTPATIENT
Start: 2022-03-06 | End: 2022-03-06 | Stop reason: HOSPADM

## 2022-03-06 RX ORDER — HEPARIN SODIUM 1000 [USP'U]/ML
4000 INJECTION, SOLUTION INTRAVENOUS; SUBCUTANEOUS ONCE
Status: COMPLETED | OUTPATIENT
Start: 2022-03-06 | End: 2022-03-06

## 2022-03-06 RX ORDER — SODIUM CHLORIDE 0.9 % (FLUSH) 0.9 %
5-40 SYRINGE (ML) INJECTION AS NEEDED
Status: DISCONTINUED | OUTPATIENT
Start: 2022-03-06 | End: 2022-03-08 | Stop reason: HOSPADM

## 2022-03-06 RX ORDER — AMLODIPINE BESYLATE 5 MG/1
10 TABLET ORAL DAILY
Status: DISCONTINUED | OUTPATIENT
Start: 2022-03-07 | End: 2022-03-08 | Stop reason: HOSPADM

## 2022-03-06 RX ORDER — ACETAMINOPHEN 325 MG/1
650 TABLET ORAL
Status: DISCONTINUED | OUTPATIENT
Start: 2022-03-06 | End: 2022-03-08 | Stop reason: HOSPADM

## 2022-03-06 RX ORDER — ONDANSETRON 4 MG/1
4 TABLET, ORALLY DISINTEGRATING ORAL
Status: DISCONTINUED | OUTPATIENT
Start: 2022-03-06 | End: 2022-03-08 | Stop reason: HOSPADM

## 2022-03-06 RX ORDER — POLYETHYLENE GLYCOL 3350 17 G/17G
17 POWDER, FOR SOLUTION ORAL DAILY PRN
Status: DISCONTINUED | OUTPATIENT
Start: 2022-03-06 | End: 2022-03-08 | Stop reason: HOSPADM

## 2022-03-06 RX ORDER — HEPARIN SODIUM 10000 [USP'U]/100ML
12-25 INJECTION, SOLUTION INTRAVENOUS
Status: DISCONTINUED | OUTPATIENT
Start: 2022-03-06 | End: 2022-03-06

## 2022-03-06 RX ORDER — GUAIFENESIN 100 MG/5ML
325 LIQUID (ML) ORAL
Status: COMPLETED | OUTPATIENT
Start: 2022-03-06 | End: 2022-03-06

## 2022-03-06 RX ORDER — HYDROCHLOROTHIAZIDE 25 MG/1
TABLET ORAL
COMMUNITY
Start: 2022-02-22 | End: 2022-03-08

## 2022-03-06 RX ORDER — FENTANYL CITRATE 50 UG/ML
INJECTION, SOLUTION INTRAMUSCULAR; INTRAVENOUS AS NEEDED
Status: DISCONTINUED | OUTPATIENT
Start: 2022-03-06 | End: 2022-03-06 | Stop reason: HOSPADM

## 2022-03-06 RX ORDER — MIDAZOLAM HYDROCHLORIDE 1 MG/ML
INJECTION, SOLUTION INTRAMUSCULAR; INTRAVENOUS AS NEEDED
Status: DISCONTINUED | OUTPATIENT
Start: 2022-03-06 | End: 2022-03-06 | Stop reason: HOSPADM

## 2022-03-06 RX ORDER — GUAIFENESIN 100 MG/5ML
81 LIQUID (ML) ORAL DAILY
Status: DISCONTINUED | OUTPATIENT
Start: 2022-03-07 | End: 2022-03-08 | Stop reason: HOSPADM

## 2022-03-06 RX ORDER — HEPARIN SODIUM 1000 [USP'U]/ML
INJECTION, SOLUTION INTRAVENOUS; SUBCUTANEOUS AS NEEDED
Status: DISCONTINUED | OUTPATIENT
Start: 2022-03-06 | End: 2022-03-06 | Stop reason: HOSPADM

## 2022-03-06 RX ORDER — SODIUM CHLORIDE 0.9 % (FLUSH) 0.9 %
5-40 SYRINGE (ML) INJECTION EVERY 8 HOURS
Status: DISCONTINUED | OUTPATIENT
Start: 2022-03-06 | End: 2022-03-08 | Stop reason: HOSPADM

## 2022-03-06 RX ADMIN — SODIUM CHLORIDE, PRESERVATIVE FREE 10 ML: 5 INJECTION INTRAVENOUS at 21:50

## 2022-03-06 RX ADMIN — ASPIRIN 324 MG: 81 TABLET, CHEWABLE ORAL at 12:46

## 2022-03-06 RX ADMIN — ACETAMINOPHEN 650 MG: 325 TABLET ORAL at 19:50

## 2022-03-06 RX ADMIN — POTASSIUM BICARBONATE 40 MEQ: 391 TABLET, EFFERVESCENT ORAL at 17:14

## 2022-03-06 RX ADMIN — SODIUM CHLORIDE, PRESERVATIVE FREE 10 ML: 5 INJECTION INTRAVENOUS at 17:57

## 2022-03-06 RX ADMIN — HEPARIN SODIUM 4000 UNITS: 1000 INJECTION INTRAVENOUS; SUBCUTANEOUS at 13:00

## 2022-03-06 RX ADMIN — ROSUVASTATIN CALCIUM 5 MG: 10 TABLET, COATED ORAL at 22:18

## 2022-03-06 RX ADMIN — METOPROLOL TARTRATE 12.5 MG: 25 TABLET, FILM COATED ORAL at 17:14

## 2022-03-06 RX ADMIN — METOPROLOL TARTRATE 12.5 MG: 25 TABLET, FILM COATED ORAL at 21:49

## 2022-03-06 NOTE — ED PROVIDER NOTES
EMERGENCY DEPARTMENT HISTORY AND PHYSICAL EXAM      Date: 3/6/2022  Patient Name: Shaista Oneill    History of Presenting Illness     Chief Complaint   Patient presents with    Chest Pain     Pt wheeled into triage with a cc of substernal chest pain that started yesterday; pt took 324 ASA at home last night    Back Pain         HPI: Shaista Oneill, 63 y.o. male presents to the ED with cc of chest pain. This started last night. He describes it as a substernal heaviness accompanied by some shortness of breath. The chest pain is constant without any identifiable exacerbating or alleviating factors. He reports associated nausea with 2 episodes of emesis as well as some increase in belching. Reports some associated diaphoresis as well. Denies any recent fevers or cough, no unilateral leg pain or leg swelling other than some chronic neuropathy. Denies any personal history of coronary artery disease, thinks he has had a stress test before, however does not remember anything remarkable. There are no other complaints, changes, or physical findings at this time. PCP: Mary Alexander, NP    No current facility-administered medications on file prior to encounter. Current Outpatient Medications on File Prior to Encounter   Medication Sig Dispense Refill    hydroCHLOROthiazide (HYDRODIURIL) 25 mg tablet       lisinopriL (PRINIVIL, ZESTRIL) 20 mg tablet Take 1 Tablet by mouth daily. 90 Tablet 1    ibuprofen (MOTRIN) 600 mg tablet Take 1 Tablet by mouth every eight (8) hours as needed for Pain. With food. No other nsaids 60 Tablet 0    amLODIPine (NORVASC) 10 mg tablet Take 1 Tablet by mouth daily. 90 Tablet 0    atorvastatin (LIPITOR) 20 mg tablet TAKE ONE TABLET BY MOUTH EVERY NIGHT AT BEDTIME (Patient not taking: Reported on 1/11/2022) 90 Tablet 0    sildenafil citrate (Viagra) 100 mg tablet Take 1 Tablet by mouth as needed for Erectile Dysfunction.  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 Family History:  Family History   Problem Relation Age of Onset    Heart Attack Mother     Diabetes Mother     Hypertension Mother     Heart Attack Father     Hypertension Father     Diabetes Daughter        Social History:  Social History     Tobacco Use    Smoking status: Former Smoker     Quit date: 2010     Years since quittin.1    Smokeless tobacco: Never Used   Vaping Use    Vaping Use: Never used   Substance Use Topics    Alcohol use: Yes    Drug use: Yes     Types: Prescription, OTC       Allergies: Allergies   Allergen Reactions    Hydrocodone Nausea Only    Naprosyn [Naproxen] Nausea and Vomiting    Pravastatin Other (comments)         Review of Systems   no fever  No ear pain  No eye pain  Reports shortness of breath  Reports chest pain  no abdominal pain  no dysuria  no leg pain  No rash  No lymphadenopathy  No weight loss    Physical Exam   Physical Exam  Constitutional:       General: He is not in acute distress. Appearance: He is not toxic-appearing. HENT:      Head: Normocephalic and atraumatic. Mouth/Throat:      Mouth: Mucous membranes are moist.   Eyes:      Extraocular Movements: Extraocular movements intact.    Cardiovascular:      Rate and Rhythm: Normal rate and regular rhythm. Pulmonary:      Effort: Pulmonary effort is normal.      Breath sounds: Normal breath sounds. Abdominal:      Palpations: Abdomen is soft. Tenderness: There is no abdominal tenderness. Musculoskeletal:         General: No deformity. Cervical back: Neck supple. Skin:     General: Skin is warm and dry. Neurological:      General: No focal deficit present. Mental Status: He is alert and oriented to person, place, and time. Psychiatric:         Mood and Affect: Mood is not anxious. Diagnostic Study Results     Labs -     Recent Results (from the past 24 hour(s))   EKG, 12 LEAD, INITIAL    Collection Time: 03/06/22 10:51 AM   Result Value Ref Range    Ventricular Rate 64 BPM    Atrial Rate 64 BPM    P-R Interval 196 ms    QRS Duration 98 ms    Q-T Interval 496 ms    QTC Calculation (Bezet) 511 ms    Calculated P Axis 59 degrees    Calculated R Axis -42 degrees    Calculated T Axis -55 degrees    Diagnosis       ** Poor data quality, interpretation may be adversely affected  Sinus rhythm with occasional premature ventricular complexes  Left axis deviation  Voltage criteria for left ventricular hypertrophy  T wave abnormality, consider inferior ischemia  Prolonged QT  No previous ECGs available     CBC WITH AUTOMATED DIFF    Collection Time: 03/06/22 11:31 AM   Result Value Ref Range    WBC 12.2 (H) 4.1 - 11.1 K/uL    RBC 4.34 4.10 - 5.70 M/uL    HGB 13.4 12.1 - 17.0 g/dL    HCT 39.7 36.6 - 50.3 %    MCV 91.5 80.0 - 99.0 FL    MCH 30.9 26.0 - 34.0 PG    MCHC 33.8 30.0 - 36.5 g/dL    RDW 13.1 11.5 - 14.5 %    PLATELET 667 958 - 434 K/uL    MPV 9.9 8.9 - 12.9 FL    NRBC 0.0 0  WBC    ABSOLUTE NRBC 0.00 0.00 - 0.01 K/uL    NEUTROPHILS 80 (H) 32 - 75 %    LYMPHOCYTES 14 12 - 49 %    MONOCYTES 6 5 - 13 %    EOSINOPHILS 0 0 - 7 %    BASOPHILS 0 0 - 1 %    IMMATURE GRANULOCYTES 0 0.0 - 0.5 %    ABS. NEUTROPHILS 9.7 (H) 1.8 - 8.0 K/UL    ABS. LYMPHOCYTES 1.7 0.8 - 3.5 K/UL    ABS. MONOCYTES 0.7 0.0 - 1.0 K/UL    ABS. EOSINOPHILS 0.0 0.0 - 0.4 K/UL    ABS. BASOPHILS 0.0 0.0 - 0.1 K/UL    ABS. IMM. GRANS. 0.0 0.00 - 0.04 K/UL    DF AUTOMATED     METABOLIC PANEL, COMPREHENSIVE    Collection Time: 03/06/22 11:31 AM   Result Value Ref Range    Sodium 138 136 - 145 mmol/L    Potassium 3.1 (L) 3.5 - 5.1 mmol/L    Chloride 103 97 - 108 mmol/L    CO2 28 21 - 32 mmol/L    Anion gap 7 5 - 15 mmol/L    Glucose 153 (H) 65 - 100 mg/dL    BUN 14 6 - 20 MG/DL    Creatinine 1.30 0.70 - 1.30 MG/DL    BUN/Creatinine ratio 11 (L) 12 - 20      GFR est AA >60 >60 ml/min/1.73m2    GFR est non-AA 57 (L) >60 ml/min/1.73m2    Calcium 8.8 8.5 - 10.1 MG/DL    Bilirubin, total 0.5 0.2 - 1.0 MG/DL    ALT (SGPT) 33 12 - 78 U/L    AST (SGOT) 187 (H) 15 - 37 U/L    Alk. phosphatase 48 45 - 117 U/L    Protein, total 7.7 6.4 - 8.2 g/dL    Albumin 3.8 3.5 - 5.0 g/dL    Globulin 3.9 2.0 - 4.0 g/dL    A-G Ratio 1.0 (L) 1.1 - 2.2     NT-PRO BNP    Collection Time: 03/06/22 11:31 AM   Result Value Ref Range    NT pro-BNP 1,514 (H) <125 PG/ML   TROPONIN-HIGH SENSITIVITY    Collection Time: 03/06/22 11:31 AM   Result Value Ref Range    Troponin-High Sensitivity 13,933 (HH) 0 - 76 ng/L   EKG, 12 LEAD, SUBSEQUENT    Collection Time: 03/06/22 12:35 PM   Result Value Ref Range    Ventricular Rate 67 BPM    Atrial Rate 267 BPM    P-R Interval 198 ms    QRS Duration 100 ms    Q-T Interval 496 ms    QTC Calculation (Bezet) 524 ms    Calculated P Axis 69 degrees    Calculated R Axis -47 degrees    Calculated T Axis -49 degrees    Diagnosis       Undetermined rhythm  Left axis deviation  Moderate voltage criteria for LVH, may be normal variant  T wave abnormality, consider inferolateral ischemia  Prolonged QT  When compared with ECG of 06-MAR-2022 10:51,  MANUAL COMPARISON REQUIRED, DATA IS UNCONFIRMED         Radiologic Studies -   XR CHEST PORT   Final Result   No acute findings.         CT Results (Last 48 hours)    None        CXR Results  (Last 48 hours)               03/06/22 1120  XR CHEST PORT Final result    Impression:  No acute findings. Narrative:  EXAM: XR CHEST PORT       INDICATION: chest pain       COMPARISON: None. FINDINGS: A portable AP radiograph of the chest was obtained at 1100 hours. There is no pneumothorax or pleural effusion. The lungs are clear. Cardiac size   is mildly enlarged. Mild thoracic aortic tortuosity is shown with otherwise   normal mediastinal and hilar contour. The bones and soft tissues are grossly   within normal limits. Medical Decision Making   I am the first provider for this patient. I reviewed the vital signs, available nursing notes, past medical history, past surgical history, family history and social history. Vital Signs-Reviewed the patient's vital signs. Patient Vitals for the past 24 hrs:   Temp Pulse Resp BP SpO2   03/06/22 1048 98.2 °F (36.8 °C) 66 18 (!) 158/98 100 %         Provider Notes (Medical Decision Making):   32-year-old presenting with chest pain. Chest heaviness with associated shortness of breath and nausea concerning for possible ACS. It is nonexertional and slightly atypical.  Differential includes musculoskeletal pain, gastritis or GERD, esophageal spasm. Denies prior cardiac history but does have risk factors including significant family history. ED Course:     Initial assessment performed. The patients presenting problems have been discussed, and they are in agreement with the care plan formulated and outlined with them. I have encouraged them to ask questions as they arise throughout their visit. EKG is performed at 10: 51, shows a sinus rhythm at a rate of 64, , QRS 98, QTc 511, left axis deviation, no ST segment elevation or depression concerning for ACS, there are T wave inversions in the inferior lateral leads, and potential?  Biphasic T wave in lead V3 as well as 1 PVC. This is interpreted as sinus rhythm and left axis deviation, T wave abnormalities. CBC with leukocytosis of 12.2, negative for anemia, basic metabolic panel with normal renal function, hypokalemia of 3.1 otherwise no worrisome electrolyte abnormalities. He will be given p.o. potassium. Chest x-ray shows no acute findings. His troponin is significantly elevated at 13,933. On reevaluation, he says the pain is improved however it still persists. EKG will be repeated. He is given p.o. aspirin and started on heparin drip. Cardiology is consulted. I have spoken with Dr. Jordan Orlando. Repeat EKG is performed at 12: 35, shows sinus rhythm at a rate of 67, , , QTc prolonged at 524, left axis deviation, no ST segment elevation or depression concerning for ACS, persistent T wave inversions in inferior lateral leads. This is interpreted as sinus rhythm with left axis deviation and stable T wave inversions from prior. On reevaluation, cardiology is at bedside, recommends catheterization. Patient will be admitted. Critical Care Time:     CRITICAL CARE NOTE :    1:12 PM    IMPENDING DETERIORATION -Cardiovascular  ASSOCIATED RISK FACTORS - Hypotension, Shock and Dysrhythmia  MANAGEMENT- Bedside Assessment and Supervision of Care  INTERPRETATION -  Xrays, ECG and Blood Pressure  INTERVENTIONS -aspirin, heparin drip  CASE REVIEW - Hospitalist/Intensivist, Medical Sub-Specialist, Nursing and Family  TREATMENT RESPONSE -Stable  PERFORMED BY - Self    NOTES   :  I have spent 60 minutes of critical care time involved in lab review, consultations with specialist, family decision- making, bedside attention and documentation. This time excludes time spent in any separate billed procedures. During this entire length of time I was immediately available to the patient . Starr Mayfield MD      Disposition:  Admit    PLAN:  1. Current Discharge Medication List        2.    Follow-up Information None       Return to ED if worse     Diagnosis     Clinical Impression: Acute NSTEMI, acute hypokalemia

## 2022-03-06 NOTE — CONSULTS
IP Cardiology Consult       Date of consult:  03/06/22  Date of admission: 3/6/2022  Primary Cardiologist: SIMON  Physician Requesting consult: Dr Lizeth Cai:    Problem list:   1. Non-ST elevation myocardial infarction  2. Hypertension  3. Hyperlipidemia  4. Sarcoidosis  5. Noncompliance  6. Family history of CAD  7. 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 History of smoking, quit 1 month ago    Lives with wife, works for [employer]         Recommendations:    1. Continue aspirin and heparin drip  2. He continued to have chest pain, will schedule him for urgent heart catheterization, discuss risk and benefit he agreed to proceed  3. Continue statin, side effects with lipitor and prava, will try crestor  4. Nitropaste  5. Continue amlodipine, lisinopril, low-dose beta-blocker if tolerates  6. 2D echocardiogram  7. Check lipid panels and hemoglobin A1c  8. DM mx per primary team     Thank you for this consult and allowing me to take part in this patients care. Please call with questions. [x]        High complexity decision making was performed      CC / Reason for consult: Chest pain, NSTEMI    History of the presenting illness:  Lin Arndt is a 63 y.o. male with past medical history of hypertension, hyperlipidemia, prediabetes, sarcoidosis presented with chest pain started yesterday, when he was at a gas station, he went home and he felt a little better. But pain continued since yesterday, he felt pressure sensation, like somebody sitting on the chest, with radiation to arm, associated with shortness of breath, nausea and vomiting and diaphoresis. He thought it will go away eventually but he persisted to have chest pain so he came to emergency room. EKG shows T wave inversion in lateral leads. Troponin was elevated in 13,000. He still has chest pain 6 out of 10. He also reports back pain which is worse in different positions. He denied any previous history of chest pain.   He stopped taking his cholesterol medications. I have told him that he will need to take dual antiplatelet therapy if he has a stent and discussed risk and benefit of left heart catheterization, he agreed and says he will be compliant with medications and agreed to proceed. I have also discussed this with his wife. 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 Authorizing Provider   hydroCHLOROthiazide (HYDRODIURIL) 25 mg tablet  2/22/22   Provider, Historical   lisinopriL (PRINIVIL, ZESTRIL) 20 mg tablet Take 1 Tablet by mouth daily. 2/2/22   Veronica Sanz, DEUCE   ibuprofen (MOTRIN) 600 mg tablet Take 1 Tablet by mouth every eight (8) hours as needed for Pain. With food. No other nsaids 2/2/22   Austen JAMIL, NP   amLODIPine (NORVASC) 10 mg tablet Take 1 Tablet by mouth daily. 2/2/22   Veronica Sanz NP   atorvastatin (LIPITOR) 20 mg tablet TAKE ONE TABLET BY MOUTH EVERY NIGHT AT BEDTIME  Patient not taking: Reported on 1/11/2022 12/20/21   Vernoica Sanz NP   sildenafil citrate (Viagra) 100 mg tablet Take 1 Tablet by mouth as needed for Erectile Dysfunction.  11/26/21   Veronica Sanz NP   Blood-Glucose Meter (FREESTYLE FREEDOM LITE) monitoring kit Check blood sugar daily 10/5/15   Corina Cade MD   glucose blood VI test strips (FREESTYLE LITE STRIPS) strip Check blood sugar daily 10/5/15   Corina Cade MD       Family History   Problem Relation Age of Onset    Heart Attack Mother    Claudette Diabetes Mother     Hypertension Mother     Heart Attack Father     Hypertension Father     Diabetes Daughter          Social History     Socioeconomic History    Marital status:      Spouse name: Not on file    Number of children: Not on file    Years of education: Not on file    Highest education level: Not on file   Occupational History    Not on file   Tobacco Use    Smoking status: Former Smoker     Quit date: 2010     Years since quittin.1    Smokeless tobacco: Never Used   Vaping Use    Vaping Use: Never used   Substance and Sexual Activity    Alcohol use: Yes    Drug use: Yes     Types: Prescription, OTC    Sexual activity: Yes     Partners: Male   Other Topics Concern    Not on file   Social History Narrative    Not on file     Social Determinants of Health     Financial Resource Strain:     Difficulty of Paying Living Expenses: Not on file   Food Insecurity:     Worried About Running Out of Food in the Last Year: Not on file    Marifer of Food in the Last Year: Not on file   Transportation Needs:     Lack of Transportation (Medical): Not on file    Lack of Transportation (Non-Medical):  Not on file   Physical Activity:     Days of Exercise per Week: Not on file    Minutes of Exercise per Session: Not on file   Stress:     Feeling of Stress : Not on file   Social Connections:     Frequency of Communication with Friends and Family: Not on file    Frequency of Social Gatherings with Friends and Family: Not on file    Attends [employer] Services: Not on file    Active Member of Clubs or Organizations: Not on file    Attends Club or Organization Meetings: Not on file    Marital Status: Not on file   Intimate Partner Violence:     Fear of Current or Ex-Partner: Not on file    Emotionally Abused: Not on file    Physically Abused: Not on file    Sexually Abused: Not on file   Housing Stability:     Unable to Pay for Housing in the Last Year: Not on file    Number of Places Lived in the Last Year: Not on file    Unstable Housing in the Last Year: Not on file         ROS      Total of 12 systems reviewed, all systems review was negative except Pertinent Positives included in HPI     Visit Vitals  BP (!) 158/98 (BP 1 Location: Left arm, BP Patient Position: At rest)   Pulse 66   Temp 98.2 °F (36.8 °C)   Resp 18   Ht 5' 10\" (1.778 m)   Wt 106.6 kg (235 lb)   SpO2 100%   BMI 33.72 kg/m²       Physical Exam  Examination:     General: Alert + Oriented x3, no acute distress   HEENT: Normocephalic aromatic, MMM   Neck: Supple, JVP- not well appreciated   RS: Non labored, clear   CVS: Regular rate and rhythm, S1S2, no murmur   Abd: Soft, non tender, non distended   Lower extremity: Warm to touch, Edema- None   Skin: Warm and dry, No significant bruises or rash   CNS: Oriented x3, no focal neuro deficit     Lab review:  BMP:   Lab Results   Component Value Date/Time     03/06/2022 11:31 AM    K 3.1 (L) 03/06/2022 11:31 AM     03/06/2022 11:31 AM    CO2 28 03/06/2022 11:31 AM    AGAP 7 03/06/2022 11:31 AM     (H) 03/06/2022 11:31 AM    BUN 14 03/06/2022 11:31 AM    CREA 1.30 03/06/2022 11:31 AM    GFRAA >60 03/06/2022 11:31 AM    GFRNA 57 (L) 03/06/2022 11:31 AM        CBC:  Lab Results   Component Value Date/Time    WBC 12.2 (H) 03/06/2022 11:31 AM    Hemoglobin (POC) 12.2 03/31/2021 08:24 AM    HGB 13.4 03/06/2022 11:31 AM    HCT 39.7 03/06/2022 11:31 AM    PLATELET 755 93/64/9030 11:31 AM    MCV 91.5 03/06/2022 11:31 AM       All Cardiac Markers in the last 24 hours:    Lab Results   Component Value Date/Time    BNPNT 1,514 (H) 03/06/2022 11:31 AM       Data review:    Tele:  NSR    EKG tracing personally reviewed:   NSR, TWI in lateral leads     Echocardiogram:    Other cardiac testing:    Other imaging:  CXR  IMPRESSION  No acute findings.       Signed:  General Raheem FORBES  Interventional Cardiology  3/6/2022

## 2022-03-06 NOTE — H&P
Hospitalist Admission Note    NAME: Marcio Tripp   :  1960   MRN:  [id number]     Date/Time:  3/6/2022 12:53 PM    Patient PCP: Shavon Medrano., NP  ______________________________________________________________________  Given the patient's current clinical presentation, I have a high level of concern for decompensation if discharged from the emergency department. Complex decision making was performed, which includes reviewing the patient's available past medical records, laboratory results, and x-ray films. My assessment of this patient's clinical condition and my plan of care is as follows. Assessment / Plan:  Non-STEMI POA  Causing chest pain at rest with shortness of breath POA  Hypokalemia POA  HTN  Hyperlipidemia  Ex-smoker  High-sensitivity troponin 68614  EKG= sinus 69 bpm with inferior lateral ischemia  Chest x-ray negative acute  proBNP 1514  Potassium 3.1    Admit to IVCU bed  N.p.o.  Status post aspirin 325 mg x 1 in ED  Start IV heparin  Inpatient cardiology consulted-plans for cardiac cath tonight noted  Replenish potassium p.o. x1  BMP in a.m. Continue home amlodipine  Holding home HCTZ, lisinopril for now  Patient has not been taking Lipitor due to side effects, will check lipid panel in a.m. Defer statins to cardiology in a.m. Check 2D echo      Code Status: Full code  Surrogate Decision Maker: Wife    DVT Prophylaxis: IV heparin as above  GI Prophylaxis: not indicated    Baseline: Patient is independent with ADLs at home lives with wife      Subjective:   CHIEF COMPLAINT: Substernal chest pain/heaviness accompanied with shortness of breath since last night    HISTORY OF PRESENT ILLNESS:     Tyler Gonzalez is a 63 y.o.  male who presents with above complaints from home with wife.   Patient present with chief complaint of substernal chest pain/heaviness since last night  History of associated shortness of breath  Chest discomfort described as constant, associated with nausea and 2 episodes of emesis with associated diaphoresis. Patient has history of hypertension and hyperlipidemia, is on antihypertensive meds but has stopped taking Lipitor due to side effects as per patient. Patient was found to have high sensitive troponin at [address]10 on work-up in ED with EKG showing inferolateral ischemia, and blood work revealing mild hypokalemia at 3.1    We were asked to admit for work up and evaluation of the above problems. 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 Social History     Tobacco Use    Smoking status: Former Smoker     Quit date: 2010     Years since quittin.1    Smokeless tobacco: Never Used   Substance Use Topics    Alcohol use: Yes        Family History   Problem Relation Age of Onset    Heart Attack Mother     Diabetes Mother     Hypertension Mother     Heart Attack Father     Hypertension Father     Diabetes Daughter      Allergies   Allergen Reactions    Hydrocodone Nausea Only    Naprosyn [Naproxen] Nausea and Vomiting    Pravastatin Other (comments)        Prior to Admission medications    Medication Sig Start Date End Date Taking? Authorizing Provider   lisinopriL (PRINIVIL, ZESTRIL) 20 mg tablet Take 1 Tablet by mouth daily. 22   Naomi Ibanez, DEUCE   ibuprofen (MOTRIN) 600 mg tablet Take 1 Tablet by mouth every eight (8) hours as needed for Pain. With food.  No other nsaids 22   Bill JAMIL NP   amLODIPine (NORVASC) 10 mg tablet Take 1 Tablet by mouth daily. 2/2/22   Ciera Clements NP   atorvastatin (LIPITOR) 20 mg tablet TAKE ONE TABLET BY MOUTH EVERY NIGHT AT BEDTIME  Patient not taking: Reported on 1/11/2022 12/20/21   Ciera Clements NP   sildenafil citrate (Viagra) 100 mg tablet Take 1 Tablet by mouth as needed for Erectile Dysfunction.  11/26/21   Ciera Clements NP   Blood-Glucose Meter (FREESTYLE FREEDOM LITE) monitoring kit Check blood sugar daily 10/5/15   Katelyn Neff MD   glucose blood VI test strips (FREESTYLE LITE STRIPS) strip Check blood sugar daily 10/5/15   Katelyn Neff MD       REVIEW OF SYSTEMS:        Total of 12 systems reviewed as follows:       POSITIVE= underlined text  Negative = text not underlined  General:  fever, chills, sweats, generalized weakness, weight loss/gain,      loss of appetite   Eyes:    blurred vision, eye pain, loss of vision, double vision  ENT:    rhinorrhea, pharyngitis   Respiratory:   cough, sputum production, SOB, VIVAR, wheezing, pleuritic pain   Cardiology:   chest pain, palpitations, orthopnea, PND, edema, syncope   Gastrointestinal:  abdominal pain , N/V, diarrhea, dysphagia, constipation, bleeding   Genitourinary:  frequency, urgency, dysuria, hematuria, incontinence   Muskuloskeletal :  arthralgia, myalgia, back pain  Hematology:  easy bruising, nose or gum bleeding, lymphadenopathy   Dermatological: rash, ulceration, pruritis, color change / jaundice  Endocrine:   hot flashes or polydipsia   Neurological:  headache, dizziness, confusion, focal weakness, paresthesia,     Speech difficulties, memory loss, gait difficulty  Psychological: Feelings of anxiety, depression, agitation    Objective:   VITALS:    Visit Vitals  BP (!) 158/98 (BP 1 Location: Left arm, BP Patient Position: At rest)   Pulse 66   Temp 98.2 °F (36.8 °C)   Resp 18   Ht 5' 10\" (1.778 m)   Wt 106.6 kg (235 lb)   SpO2 100%   BMI 33.72 kg/m²       PHYSICAL EXAM:    General:    Alert, cooperative, no distress, appears stated age. HEENT: Atraumatic, anicteric sclerae, pink conjunctivae     No oral ulcers, mucosa moist, throat clear, dentition fair  Neck:  Supple, symmetrical,  thyroid: non tender  Lungs:   Clear to auscultation bilaterally. No Wheezing or Rhonchi. No rales. Chest wall:  No tenderness  No Accessory muscle use. Heart:   Regular  rhythm,  No  murmur   No edema  Abdomen:   Soft, non-tender. Not distended. Bowel sounds normal  Extremities: No cyanosis. No clubbing,      Skin turgor normal, Capillary refill normal, Radial dial pulse 2+  Skin:     Not pale. Not Jaundiced  No rashes   Psych:  Good insight. Not depressed. Not anxious or agitated. Neurologic: EOMs intact. No facial asymmetry. No aphasia or slurred speech. Symmetrical strength, Sensation grossly intact. Alert and oriented X 4.     _______________________________________________________________________  Care Plan discussed with:    Comments   Patient x    Family  x  wife at bedside in ED   RN x    Care Manager                    Consultant:  x ED MD Andre Case, Dr Paula Sherman (cardio)   _______________________________________________________________________  Expected  Disposition:   Home with Family x   HH/PT/OT/RN    SNF/LTC    EVITA    ________________________________________________________________________  TOTAL TIME:  54 Minutes    Critical Care Provided     Minutes non procedure based      Comments    x Reviewed previous records   >50% of visit spent in counseling and coordination of care x Discussion with patient and family and questions answered       ________________________________________________________________________  Signed: Thomas Orellana MD    Procedures: see electronic medical records for all procedures/Xrays and details which were not copied into this note but were reviewed prior to creation of Plan.     LAB DATA REVIEWED:    Recent Results (from the past 24 hour(s))   EKG, 12 LEAD, INITIAL    Collection Time: 03/06/22 10:51 AM   Result Value Ref Range    Ventricular Rate 64 BPM    Atrial Rate 64 BPM    P-R Interval 196 ms    QRS Duration 98 ms    Q-T Interval 496 ms    QTC Calculation (Bezet) 511 ms    Calculated P Axis 59 degrees    Calculated R Axis -42 degrees    Calculated T Axis -55 degrees    Diagnosis       ** Poor data quality, interpretation may be adversely affected  Sinus rhythm with occasional premature ventricular complexes  Left axis deviation  Voltage criteria for left ventricular hypertrophy  T wave abnormality, consider inferior ischemia  Prolonged QT  No previous ECGs available     CBC WITH AUTOMATED DIFF    Collection Time: 03/06/22 11:31 AM   Result Value Ref Range    WBC 12.2 (H) 4.1 - 11.1 K/uL    RBC 4.34 4.10 - 5.70 M/uL    HGB 13.4 12.1 - 17.0 g/dL    HCT 39.7 36.6 - 50.3 %    MCV 91.5 80.0 - 99.0 FL    MCH 30.9 26.0 - 34.0 PG    MCHC 33.8 30.0 - 36.5 g/dL    RDW 13.1 11.5 - 14.5 %    PLATELET 514 278 - 073 K/uL    MPV 9.9 8.9 - 12.9 FL    NRBC 0.0 0  WBC    ABSOLUTE NRBC 0.00 0.00 - 0.01 K/uL    NEUTROPHILS 80 (H) 32 - 75 %    LYMPHOCYTES 14 12 - 49 %    MONOCYTES 6 5 - 13 %    EOSINOPHILS 0 0 - 7 %    BASOPHILS 0 0 - 1 %    IMMATURE GRANULOCYTES 0 0.0 - 0.5 %    ABS. NEUTROPHILS 9.7 (H) 1.8 - 8.0 K/UL    ABS. LYMPHOCYTES 1.7 0.8 - 3.5 K/UL    ABS. MONOCYTES 0.7 0.0 - 1.0 K/UL    ABS. EOSINOPHILS 0.0 0.0 - 0.4 K/UL    ABS. BASOPHILS 0.0 0.0 - 0.1 K/UL    ABS. IMM.  GRANS. 0.0 0.00 - 0.04 K/UL    DF AUTOMATED     METABOLIC PANEL, COMPREHENSIVE    Collection Time: 03/06/22 11:31 AM   Result Value Ref Range    Sodium 138 136 - 145 mmol/L    Potassium 3.1 (L) 3.5 - 5.1 mmol/L    Chloride 103 97 - 108 mmol/L    CO2 28 21 - 32 mmol/L    Anion gap 7 5 - 15 mmol/L    Glucose 153 (H) 65 - 100 mg/dL    BUN 14 6 - 20 MG/DL    Creatinine 1.30 0.70 - 1.30 MG/DL    BUN/Creatinine ratio 11 (L) 12 - 20      GFR est AA >60 >60 ml/min/1.73m2    GFR est non-AA 57 (L) >60 ml/min/1.73m2    Calcium 8.8 8.5 - 10.1 MG/DL Bilirubin, total 0.5 0.2 - 1.0 MG/DL    ALT (SGPT) 33 12 - 78 U/L    AST (SGOT) 187 (H) 15 - 37 U/L    Alk.  phosphatase 48 45 - 117 U/L    Protein, total 7.7 6.4 - 8.2 g/dL    Albumin 3.8 3.5 - 5.0 g/dL    Globulin 3.9 2.0 - 4.0 g/dL    A-G Ratio 1.0 (L) 1.1 - 2.2     NT-PRO BNP    Collection Time: 03/06/22 11:31 AM   Result Value Ref Range    NT pro-BNP 1,514 (H) <125 PG/ML   TROPONIN-HIGH SENSITIVITY    Collection Time: 03/06/22 11:31 AM   Result Value Ref Range    Troponin-High Sensitivity 13,933 (HH) 0 - 76 ng/L   EKG, 12 LEAD, SUBSEQUENT    Collection Time: 03/06/22 12:35 PM   Result Value Ref Range    Ventricular Rate 67 BPM    Atrial Rate 267 BPM    P-R Interval 198 ms    QRS Duration 100 ms    Q-T Interval 496 ms    QTC Calculation (Bezet) 524 ms    Calculated P Axis 69 degrees    Calculated R Axis -47 degrees    Calculated T Axis -49 degrees    Diagnosis       Undetermined rhythm  Left axis deviation  Moderate voltage criteria for LVH, may be normal variant  T wave abnormality, consider inferolateral ischemia  Prolonged QT  When compared with ECG of 06-MAR-2022 10:51,  MANUAL COMPARISON REQUIRED, DATA IS UNCONFIRMED

## 2022-03-06 NOTE — Clinical Note
TRANSFER - OUT REPORT:     Verbal report given to: [facility]. Report consisted of patient's Situation, Background, Assessment and   Recommendations(SBAR). Opportunity for questions and clarification was provided. Patient transported with a Registered Nurse.

## 2022-03-06 NOTE — PROGRESS NOTES
Brief Procedure Note:         Indication:  Chest pain, NSTEMI      Procedure:   LHC, Cors  Occluded distal RCA, Severe proximal RCA disease   S/p PCI of distal RCA with FRANSISCO x1, proximal RCA with FRANSISCO x1   IFR of LAD     Complications: None   Blood loss: Minimal   Condition: Stable     Brief procedure Result:    Thrombotic occlusion of distal RCA - culprit, Severe proximal RCA disease    S/p PCI of distal RCA with FRANSISCO x1, proximal RCA with FRANSISCO x1   IFR of LAD - intermediate - IFR was not significant   Significant diagonal disease and OM disease - will manage with medical therapy for now   If     Recommendations:   Continue aspirin, Ticagrelor   Crestor   GDMT   Will medical therapy for left sided disease first   Will address it later, IFR was borderline 0.9-0.92  - dropped to 0.8 after IC nitro but recovered and settled at baseline of 0.9,  may consider FFR of LAD as OP     Full note and recommendations to follow. '

## 2022-03-06 NOTE — PROGRESS NOTES
110: TRANSFER - IN REPORT:    Verbal report received from [facility] on [facility] Lightning  being received from the Cath Lab for routine post - op      Report consisted of patients Situation, Background, Assessment and   Recommendations(SBAR). Information from the following report(s) SBAR, Kardex, Procedure Summary, Intake/Output, MAR, Recent Results and Cardiac Rhythm Sinus Rhythm was reviewed with the receiving nurse. Opportunity for questions and clarification was provided. Assessment completed upon patients arrival to CCU room 2552 at 1520 and care assumed.

## 2022-03-07 ENCOUNTER — TRANSCRIBE ORDER (OUTPATIENT)
Dept: CARDIAC REHAB | Age: 62
End: 2022-03-07

## 2022-03-07 ENCOUNTER — APPOINTMENT (OUTPATIENT)
Dept: NON INVASIVE DIAGNOSTICS | Age: 62
DRG: 174 | End: 2022-03-07
Attending: STUDENT IN AN ORGANIZED HEALTH CARE EDUCATION/TRAINING PROGRAM
Payer: MEDICAID

## 2022-03-07 DIAGNOSIS — I21.4 ACUTE MYOCARDIAL INFARCTION, SUBENDOCARDIAL INFARCTION, INITIAL EPISODE OF CARE (HCC): Primary | ICD-10-CM

## 2022-03-07 LAB
ANION GAP SERPL CALC-SCNC: 12 MMOL/L (ref 5–15)
APPEARANCE UR: CLEAR
ATRIAL RATE: 267 BPM
ATRIAL RATE: 64 BPM
BACTERIA URNS QL MICRO: NEGATIVE /HPF
BILIRUB UR QL: NEGATIVE
BUN SERPL-MCNC: 18 MG/DL (ref 6–20)
BUN/CREAT SERPL: 10 (ref 12–20)
CALCIUM SERPL-MCNC: 8.5 MG/DL (ref 8.5–10.1)
CALCULATED P AXIS, ECG09: 59 DEGREES
CALCULATED P AXIS, ECG09: 69 DEGREES
CALCULATED R AXIS, ECG10: -42 DEGREES
CALCULATED R AXIS, ECG10: -47 DEGREES
CALCULATED T AXIS, ECG11: -49 DEGREES
CALCULATED T AXIS, ECG11: -55 DEGREES
CHLORIDE SERPL-SCNC: 103 MMOL/L (ref 97–108)
CHOLEST SERPL-MCNC: 261 MG/DL
CO2 SERPL-SCNC: 21 MMOL/L (ref 21–32)
COLOR UR: NORMAL
CREAT SERPL-MCNC: 1.72 MG/DL (ref 0.7–1.3)
DIAGNOSIS, 93000: NORMAL
DIAGNOSIS, 93000: NORMAL
ECHO AO ASC DIAM: 4.2 CM
ECHO AO ASCENDING AORTA INDEX: 1.88 CM/M2
ECHO AV AREA PEAK VELOCITY: 2.1 CM2
ECHO AV AREA VTI: 2.2 CM2
ECHO AV AREA/BSA PEAK VELOCITY: 0.9 CM2/M2
ECHO AV AREA/BSA VTI: 1 CM2/M2
ECHO AV MEAN GRADIENT: 6 MMHG
ECHO AV MEAN VELOCITY: 1.1 M/S
ECHO AV PEAK GRADIENT: 10 MMHG
ECHO AV PEAK VELOCITY: 1.6 M/S
ECHO AV VELOCITY RATIO: 0.63
ECHO AV VTI: 25.6 CM
ECHO LA DIAMETER INDEX: 1.7 CM/M2
ECHO LA DIAMETER: 3.8 CM
ECHO LV E' LATERAL VELOCITY: 3 CM/S
ECHO LV E' SEPTAL VELOCITY: 5 CM/S
ECHO LV EDV A4C: 173 ML
ECHO LV EDV INDEX A4C: 77 ML/M2
ECHO LV EJECTION FRACTION A4C: 46 %
ECHO LV ESV A4C: 94 ML
ECHO LV ESV INDEX A4C: 42 ML/M2
ECHO LV FRACTIONAL SHORTENING: 21 % (ref 28–44)
ECHO LV INTERNAL DIMENSION DIASTOLE INDEX: 2.5 CM/M2
ECHO LV INTERNAL DIMENSION DIASTOLIC: 5.6 CM (ref 4.2–5.9)
ECHO LV INTERNAL DIMENSION SYSTOLIC INDEX: 1.96 CM/M2
ECHO LV INTERNAL DIMENSION SYSTOLIC: 4.4 CM
ECHO LV IVSD: 1.1 CM (ref 0.6–1)
ECHO LV MASS 2D: 313.2 G (ref 88–224)
ECHO LV MASS INDEX 2D: 139.8 G/M2 (ref 49–115)
ECHO LV POSTERIOR WALL DIASTOLIC: 1.5 CM (ref 0.6–1)
ECHO LV RELATIVE WALL THICKNESS RATIO: 0.54
ECHO LVOT AREA: 3.5 CM2
ECHO LVOT AV VTI INDEX: 0.65
ECHO LVOT DIAM: 2.1 CM
ECHO LVOT MEAN GRADIENT: 2 MMHG
ECHO LVOT PEAK GRADIENT: 4 MMHG
ECHO LVOT PEAK VELOCITY: 1 M/S
ECHO LVOT STROKE VOLUME INDEX: 25.8 ML/M2
ECHO LVOT SV: 57.8 ML
ECHO LVOT VTI: 16.7 CM
ECHO MV A VELOCITY: 0.81 M/S
ECHO MV AREA VTI: 3.3 CM2
ECHO MV E DECELERATION TIME (DT): 168 MS
ECHO MV E VELOCITY: 1 M/S
ECHO MV E/A RATIO: 1.23
ECHO MV E/E' LATERAL: 33.33
ECHO MV E/E' RATIO (AVERAGED): 26.67
ECHO MV E/E' SEPTAL: 20
ECHO MV LVOT VTI INDEX: 1.04
ECHO MV MAX VELOCITY: 0.7 M/S
ECHO MV MEAN GRADIENT: 1 MMHG
ECHO MV MEAN VELOCITY: 0.5 M/S
ECHO MV PEAK GRADIENT: 2 MMHG
ECHO MV VTI: 17.4 CM
EPITH CASTS URNS QL MICRO: NORMAL /LPF
ERYTHROCYTE [DISTWIDTH] IN BLOOD BY AUTOMATED COUNT: 13.3 % (ref 11.5–14.5)
GLUCOSE SERPL-MCNC: 148 MG/DL (ref 65–100)
GLUCOSE UR STRIP.AUTO-MCNC: NEGATIVE MG/DL
HCT VFR BLD AUTO: 38 % (ref 36.6–50.3)
HDLC SERPL-MCNC: 64 MG/DL
HDLC SERPL: 4.1 {RATIO} (ref 0–5)
HGB BLD-MCNC: 12.4 G/DL (ref 12.1–17)
HGB UR QL STRIP: NEGATIVE
HYALINE CASTS URNS QL MICRO: NORMAL /LPF (ref 0–5)
KETONES UR QL STRIP.AUTO: NEGATIVE MG/DL
LDLC SERPL CALC-MCNC: 179.8 MG/DL (ref 0–100)
LEUKOCYTE ESTERASE UR QL STRIP.AUTO: NEGATIVE
MAGNESIUM SERPL-MCNC: 2.2 MG/DL (ref 1.6–2.4)
MCH RBC QN AUTO: 30.8 PG (ref 26–34)
MCHC RBC AUTO-ENTMCNC: 32.6 G/DL (ref 30–36.5)
MCV RBC AUTO: 94.5 FL (ref 80–99)
NITRITE UR QL STRIP.AUTO: NEGATIVE
NRBC # BLD: 0 K/UL (ref 0–0.01)
NRBC BLD-RTO: 0 PER 100 WBC
P-R INTERVAL, ECG05: 196 MS
P-R INTERVAL, ECG05: 198 MS
PH UR STRIP: 7 [PH] (ref 5–8)
PHOSPHATE SERPL-MCNC: 3.2 MG/DL (ref 2.6–4.7)
PLATELET # BLD AUTO: 207 K/UL (ref 150–400)
PMV BLD AUTO: 10.4 FL (ref 8.9–12.9)
POTASSIUM SERPL-SCNC: 3.6 MMOL/L (ref 3.5–5.1)
PROT UR STRIP-MCNC: NEGATIVE MG/DL
Q-T INTERVAL, ECG07: 496 MS
Q-T INTERVAL, ECG07: 496 MS
QRS DURATION, ECG06: 100 MS
QRS DURATION, ECG06: 98 MS
QTC CALCULATION (BEZET), ECG08: 511 MS
QTC CALCULATION (BEZET), ECG08: 524 MS
RBC # BLD AUTO: 4.02 M/UL (ref 4.1–5.7)
RBC #/AREA URNS HPF: NORMAL /HPF (ref 0–5)
SODIUM SERPL-SCNC: 136 MMOL/L (ref 136–145)
SP GR UR REFRACTOMETRY: 1.02 (ref 1–1.03)
TRIGL SERPL-MCNC: 86 MG/DL (ref ?–150)
TSH SERPL DL<=0.05 MIU/L-ACNC: 1.25 UIU/ML (ref 0.36–3.74)
UA: UC IF INDICATED,UAUC: NORMAL
UROBILINOGEN UR QL STRIP.AUTO: 1 EU/DL (ref 0.2–1)
VENTRICULAR RATE, ECG03: 64 BPM
VENTRICULAR RATE, ECG03: 67 BPM
VLDLC SERPL CALC-MCNC: 17.2 MG/DL
WBC # BLD AUTO: 14.2 K/UL (ref 4.1–11.1)
WBC URNS QL MICRO: NORMAL /HPF (ref 0–4)

## 2022-03-07 PROCEDURE — 84100 ASSAY OF PHOSPHORUS: CPT

## 2022-03-07 PROCEDURE — 87086 URINE CULTURE/COLONY COUNT: CPT

## 2022-03-07 PROCEDURE — 80048 BASIC METABOLIC PNL TOTAL CA: CPT

## 2022-03-07 PROCEDURE — 87040 BLOOD CULTURE FOR BACTERIA: CPT

## 2022-03-07 PROCEDURE — 74011000250 HC RX REV CODE- 250: Performed by: INTERNAL MEDICINE

## 2022-03-07 PROCEDURE — 81001 URINALYSIS AUTO W/SCOPE: CPT

## 2022-03-07 PROCEDURE — 74011250636 HC RX REV CODE- 250/636: Performed by: INTERNAL MEDICINE

## 2022-03-07 PROCEDURE — 36415 COLL VENOUS BLD VENIPUNCTURE: CPT

## 2022-03-07 PROCEDURE — 65660000000 HC RM CCU STEPDOWN

## 2022-03-07 PROCEDURE — 83735 ASSAY OF MAGNESIUM: CPT

## 2022-03-07 PROCEDURE — 74011250637 HC RX REV CODE- 250/637: Performed by: STUDENT IN AN ORGANIZED HEALTH CARE EDUCATION/TRAINING PROGRAM

## 2022-03-07 PROCEDURE — 74011250637 HC RX REV CODE- 250/637: Performed by: INTERNAL MEDICINE

## 2022-03-07 PROCEDURE — 93306 TTE W/DOPPLER COMPLETE: CPT

## 2022-03-07 PROCEDURE — 84443 ASSAY THYROID STIM HORMONE: CPT

## 2022-03-07 PROCEDURE — 80061 LIPID PANEL: CPT

## 2022-03-07 PROCEDURE — 85027 COMPLETE CBC AUTOMATED: CPT

## 2022-03-07 RX ORDER — LISINOPRIL 5 MG/1
10 TABLET ORAL DAILY
Status: DISCONTINUED | OUTPATIENT
Start: 2022-03-07 | End: 2022-03-07

## 2022-03-07 RX ORDER — SODIUM CHLORIDE 9 MG/ML
75 INJECTION, SOLUTION INTRAVENOUS CONTINUOUS
Status: DISCONTINUED | OUTPATIENT
Start: 2022-03-07 | End: 2022-03-08 | Stop reason: HOSPADM

## 2022-03-07 RX ORDER — LISINOPRIL 5 MG/1
5 TABLET ORAL DAILY
Status: DISCONTINUED | OUTPATIENT
Start: 2022-03-07 | End: 2022-03-08

## 2022-03-07 RX ORDER — METOPROLOL TARTRATE 25 MG/1
25 TABLET, FILM COATED ORAL EVERY 12 HOURS
Status: DISCONTINUED | OUTPATIENT
Start: 2022-03-07 | End: 2022-03-07

## 2022-03-07 RX ORDER — METOPROLOL TARTRATE 50 MG/1
50 TABLET ORAL EVERY 12 HOURS
Status: DISCONTINUED | OUTPATIENT
Start: 2022-03-07 | End: 2022-03-08 | Stop reason: HOSPADM

## 2022-03-07 RX ADMIN — SODIUM CHLORIDE, PRESERVATIVE FREE 10 ML: 5 INJECTION INTRAVENOUS at 06:33

## 2022-03-07 RX ADMIN — ROSUVASTATIN CALCIUM 5 MG: 10 TABLET, COATED ORAL at 21:08

## 2022-03-07 RX ADMIN — ASPIRIN 81 MG: 81 TABLET, CHEWABLE ORAL at 08:56

## 2022-03-07 RX ADMIN — TICAGRELOR 90 MG: 90 TABLET ORAL at 21:08

## 2022-03-07 RX ADMIN — METOPROLOL TARTRATE 25 MG: 25 TABLET, FILM COATED ORAL at 08:57

## 2022-03-07 RX ADMIN — SODIUM CHLORIDE, PRESERVATIVE FREE 10 ML: 5 INJECTION INTRAVENOUS at 21:09

## 2022-03-07 RX ADMIN — AMLODIPINE BESYLATE 10 MG: 5 TABLET ORAL at 08:57

## 2022-03-07 RX ADMIN — LISINOPRIL 5 MG: 5 TABLET ORAL at 09:03

## 2022-03-07 RX ADMIN — METOPROLOL 50 MG: 50 TABLET ORAL at 21:08

## 2022-03-07 RX ADMIN — SODIUM CHLORIDE, PRESERVATIVE FREE 10 ML: 5 INJECTION INTRAVENOUS at 14:47

## 2022-03-07 RX ADMIN — SODIUM CHLORIDE 75 ML/HR: 9 INJECTION, SOLUTION INTRAVENOUS at 17:02

## 2022-03-07 RX ADMIN — TICAGRELOR 90 MG: 90 TABLET ORAL at 08:57

## 2022-03-07 NOTE — PROGRESS NOTES
1915: Bedside shift change report given to Feliz Quiroz (oncoming nurse) by. Report included the following information SBAR, Kardex, Intake/Output, MAR, Recent Results and Cardiac Rhythm Sinus Rhythm.

## 2022-03-07 NOTE — PROGRESS NOTES
0700- Shift report received from DORIS Ellis RN Report included the following information SBAR, Kardex, ED Summary, Procedure Summary, Intake/Output, MAR, and Recent Results    0800- Shift assessment complete, pt resting comfortably in bed. A&O x4 and following commands appropriately. Pulses palpable in all extremities. L AC access intact, nothing infusing at this time. See MAR and Flowsheets for more details. 1200- Reassessment complete, pt resting comfortably in bed. No changes noted from previous assessment, see Flowsheets for more details. 1230- ECHO at bedside. 1600- Reassessment complete, pt resting comfortably in bed. No changes noted from previous assessment, see Flowsheets for more details. 1900- Shift report given to LICHA Garcia RN Report included the following information SBAR, Kardex, ED Summary, Procedure Summary, Intake/Output, MAR, and Recent Results

## 2022-03-07 NOTE — PROGRESS NOTES
Hospitalist Progress Note    NAME: Whit Torres   :  1960   MRN:  [id number]       Assessment / Plan:  Non-ST elevation MI  Uncontrolled hypertension  Dyslipidemia   S/P LHC, thrombotic occlusion of distal RCA and severe prox RCA disease, s/p PCI, significant disease of OM, large Diagonal branch, intermediate disease of LAD not significant by IFR but was orderline    Continue aspirin, Brilinta, statin, beta-blocker  Continue lisinopril and amlodipine  Lipid panel pending  Recent hemoglobin A1c is 5.8  Echocardiogram showed preserved EF  Currently chest pain-free    SIRS  -Patient had a fever of 102 and leukocytosis  -No clear source signs of infection  -Continue to monitor for now, obtain blood cultures and urinalysis  -Continues to have low-grade fever this morning  -Hold off on empiric antibiotics until clear source of infection    Elevated creatinine  -Presented with creatinine of 1.3, creatinine is up to 1.7  -Likely due to contrast received during heart cath  -We will give gentle hydration follow-up on BMP    Former smoker:  -Quit smoking about a month ago    30.0 - 39.9 Obese / Body mass index is 33.72 kg/m². Code status: Full  Prophylaxis: Lovenox  Recommended Disposition: Home w/Family     Subjective:     Chief Complaint / Reason for Physician Visit  \" As any chest pain, noted to have fever last night, denies any cough, sputum production or urine symptoms, denies any diarrhea\". Discussed with RN events overnight. Review of Systems:  Symptom Y/N Comments  Symptom Y/N Comments   Fever/Chills n   Chest Pain n    Poor Appetite n   Edema n    Cough n   Abdominal Pain n    Sputum n   Joint Pain     SOB/VIVAR n   Pruritis/Rash     Nausea/vomit n   Tolerating PT/OT     Diarrhea n   Tolerating Diet     Constipation n   Other       Could NOT obtain due to:      Objective:     VITALS:   Last 24hrs VS reviewed since prior progress note.  Most recent are:  Patient Vitals for the past 24 hrs:   Temp Pulse Resp BP SpO2   03/07/22 1226    (!) 164/98    03/07/22 1200 100.3 °F (37.9 °C) 82 24 (!) 164/98 98 %   03/07/22 1100  83 16 (!) 142/79 98 %   03/07/22 1000  77 21 (!) 146/88 99 %   03/07/22 0900  87 12 (!) 156/90 99 %   03/07/22 0800 99.7 °F (37.6 °C) 90 18 137/74 97 %   03/07/22 0700  88 17 (!) 108/49 98 %   03/07/22 0630  87 19 130/79 98 %   03/07/22 0600  97 18     03/07/22 0500  91 20 117/67 97 %   03/07/22 0400 99.1 °F (37.3 °C) 93 17 134/67 97 %   03/07/22 0300  88 15 137/67 99 %   03/07/22 0200  85 18 123/71 95 %   03/07/22 0100 100.1 °F (37.8 °C) 91 15 129/81 98 %   03/07/22 0000  81 19 129/80 96 %   03/06/22 2300  96 24 (!) 116/96 98 %   03/06/22 2230  98 20 (!) 149/79 98 %   03/06/22 2200 (!) 100.6 °F (38.1 °C) (!) 101 15 136/89 98 %   03/06/22 2130  100 13 (!) 143/85 98 %   03/06/22 2100  96 18 (!) 147/87 98 %   03/06/22 2045  97 16 (!) 144/81 99 %   03/06/22 2030  93 20 139/75 99 %   03/06/22 2015  94 20 (!) 151/84 99 %   03/06/22 2000  92 15 128/89 99 %   03/06/22 1955  91 14 136/85 99 %   03/06/22 1950  91 19 132/75 98 %   03/06/22 1945  91 22 (!) 129/94 100 %   03/06/22 1941 (!) 102.6 °F (39.2 °C)       03/06/22 1940  92 20 122/86 98 %   03/06/22 1935  95 15 114/89 100 %   03/06/22 1930  96 17 (!) 140/73 99 %   03/06/22 1925  90 16  98 %   03/06/22 1920  91 18  99 %   03/06/22 1915  99 21 129/81 99 %   03/06/22 1910  93 14  98 %   03/06/22 1905  89 19  97 %   03/06/22 1900  82 16 (!) 142/86 96 %   03/06/22 1845  78 16 (!) 154/85 97 %   03/06/22 1830  76 18 (!) 147/84 97 %   03/06/22 1815  74 18 137/86 98 %   03/06/22 1800  76 18 (!) 144/78 97 %   03/06/22 1745  73 15 127/74 97 %   03/06/22 1730  68 10 130/71 96 %   03/06/22 1715  72 21 120/79 97 %   03/06/22 1714  75  131/79    03/06/22 1700  72 19 (!) 108/35 100 %   03/06/22 1645  71 21 132/69 99 %   03/06/22 1630  78 17 (!) 170/97 100 %   03/06/22 1615  72 15 (!) 153/96 100 %   03/06/22 1600  72 13 (!) 171/99 99 %   03/06/22 1545  75 16 (!) 168/104 100 %   03/06/22 1530 98.3 °F (36.8 °C) 82 15 (!) 177/99 99 %       Intake/Output Summary (Last 24 hours) at 3/7/2022 1514  Last data filed at 3/7/2022 1306  Gross per 24 hour   Intake 360 ml   Output 450 ml   Net -90 ml        PHYSICAL EXAM:  General: WD, WN. Alert, cooperative, no acute distress    EENT:  EOMI. Anicteric sclerae. MMM  Resp:  CTA bilaterally, no wheezing or rales. No accessory muscle use  CV:  Regular  rhythm,  No edema  GI:  Soft, Non distended, Non tender.  +Bowel sounds  Neurologic:  Alert and oriented X 3, normal speech,   Psych:   Good insight. Not anxious nor agitated  Skin:  No rashes. No jaundice    Reviewed most current lab test results and cultures  YES  Reviewed most current radiology test results   YES  Review and summation of old records today    NO  Reviewed patient's current orders and MAR    YES  PMH/SH reviewed - no change compared to H&P  ________________________________________________________________________  Care Plan discussed with:    Comments   Patient x    Family      RN x    Care Manager     Consultant                        Multidiciplinary team rounds were held today with , nursing, pharmacist and clinical coordinator. Patient's plan of care was discussed; medications were reviewed and discharge planning was addressed. ________________________________________________________________________  Total NON critical care TIME:  30   Minutes    Total CRITICAL CARE TIME Spent:   Minutes non procedure based      Comments   >50% of visit spent in counseling and coordination of care     ________________________________________________________________________  Umu Foster MD     Procedures: see electronic medical records for all procedures/Xrays and details which were not copied into this note but were reviewed prior to creation of Plan.       LABS:  I reviewed today's most current labs and imaging studies.   Pertinent labs include:  Recent Labs     03/07/22  0630 03/06/22  1131   WBC 14.2* 12.2*   HGB 12.4 13.4   HCT 38.0 39.7    230     Recent Labs     03/07/22  0630 03/06/22  1131    138   K 3.6 3.1*    103   CO2 21 28   * 153*   BUN 18 14   CREA 1.72* 1.30   CA 8.5 8.8   MG 2.2  --    PHOS 3.2  --    ALB  --  3.8   TBILI  --  0.5   ALT  --  33       Signed: Brenden Cintron MD

## 2022-03-07 NOTE — PROGRESS NOTES
1900.  Received beside verbal report from [facility]. Included were Kardex, MAR, orders, labs, ITRACE, LDA's, events from today and plans for tonight. Dual skin assessment performed at bedside with offgoing nurse.    Kathryn Lee. TR band 1ml removed,   1946. TR band removed and sterile occlusive dressing applied. 1952. Site c/d/i  2000. Site c/d/i    2000. Shift assessment performed. See flowsheet for details. 2300. Vascular checks, wnl, see flowsheet. Pt removed splint from operative arm. No complaints at this time. 0000. Reassessment, as noted. 0400. Reassessment, as noted in flowsheet.    0700.   Bedside report given to oncoming nurse

## 2022-03-07 NOTE — PROGRESS NOTES
Spiritual Care Assessment/Progress Note  [facility]      NAME: Marcio Tripp      MRN: [id number]  AGE: 63 y.o.  SEX: male  Gnosticist Affiliation: Samaritan   Language: English     3/7/2022     Total Time (in minutes): 20     Spiritual Assessment begun in [facility] 2 CRITICAL CARE 1 through conversation with:         [x]Patient        [] Family    [] Friend(s)        Reason for Consult: Initial/Spiritual assessment, critical care     Spiritual beliefs: (Please include comment if needed)     [x] Identifies with a conchis tradition:         [] Supported by a conchis community:            [] Claims no spiritual orientation:           [] Seeking spiritual identity:                [] Adheres to an individual form of spirituality:           [] Not able to assess:                           Identified resources for coping:      [x] Prayer                               [] Music                  [] Guided Imagery     [x] Family/friends                 [] Pet visits     [x] Devotional reading                         [] Unknown     [] Other:                                             Interventions offered during this visit: (See comments for more details)    Patient Interventions: Initial/Spiritual assessment, Critical care,Coping skills reviewed/reinforced,Affirmation of conchis,Gnosticist beliefs/image of God discussed,Life review/legacy,Prayer (actual)           Plan of Care:     [] Support spiritual and/or cultural needs    [] Support AMD and/or advance care planning process      [] Support grieving process   [] Coordinate Rites and/or Rituals    [] Coordination with community clergy   [] No spiritual needs identified at this time   [] Detailed Plan of Care below (See Comments)  [] Make referral to Music Therapy  [] Make referral to Pet Therapy     [] Make referral to Addiction services  [] Make referral to [facility]  [] Make referral to Spiritual Care Partner  [] No future visits requested        [x] Contact Spiritual Care for further referrals     Comments: Initial spiritual assessment with pt in 2552. Pt awake and alert, greeted  pleasantly. Engaged in conversation about Episcopal sharing his thoughts and affinity for the Bible. Identifies as [place]. Pt feeling well and hoping to go home soon. Shared some health history and about his family. Pt thanked  for visiting and accepted prayer. No further needs or concerns at this time. Contact Spiritual Care for any further referrals.   Juan Diego Schaffer M.Div, [facility]   Paging Service [phone])

## 2022-03-07 NOTE — PROGRESS NOTES
Progress Note      3/7/2022 8:07 AM  NAME: Jermaine Donovan   MRN:  [id number]   Admit Diagnosis: NSTEMI (non-ST elevated myocardial infarction) [facility]) [I21.4]            Assessment:     Problem list:   1. Non-ST elevation myocardial infarction S/P LHC, thrombotic occlusion of distal RCA and severe prox RCA disease, s/p PCI, significant disease of OM, large Diagonal branch, intermediate disease of LAD not significant by IFR but was orderline    2. Hypertension  3. Hyperlipidemia  4. Sarcoidosis  5. Noncompliance  6. Family history of CAD  7. 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 History of smoking, quit 1 month ago     Lives with wife, works for [employer]            Recommendations:     1. Continue aspirin and  ticagrelor   2. Increase metoprolol to 25 mg po BID   3. Resume lisinopril at lower dose   4. Cont amlodipine   5. Continue statin, side effects with lipitor and prava, will try crestor  6. 2D echocardiogram  7. Check lipid panels and hemoglobin A1c  8. DM mx per primary team   9. Address left sided disease as OP, medical therapy for now.     Thank you for this consult and allowing me to take part in this patients care. Please call with questions.            Subjective:     HPI:   no CP OR SOB     ROS: No CP, SOB, Abd pain, nausea, vomiting, syncope, palpitations, new focal neurological symptoms     Objective:      Physical Exam:    Last 24hrs VS reviewed since prior progress note.  Most recent are:    Visit Vitals  BP (!) 108/49   Pulse 88   Temp 99.1 °F (37.3 °C)   Resp 17   Ht 5' 10\" (1.778 m)   Wt 106.6 kg (235 lb)   SpO2 98%   BMI 33.72 kg/m²       Intake/Output Summary (Last 24 hours) at 3/7/2022 2071  Last data filed at 3/7/2022 0100  Gross per 24 hour   Intake    Output 225 ml   Net -225 ml       General: Alert and oriented x3, no acute distress   Neck: Supple   Respiratory: No respiratory distress, clear lung sound   Cardiovascular: Regular rate rhythm, S1S2, no murmur   Abdomen: soft, non tender, non distended   Neuro: moves all extremities, oriented x3   Skin: warm and dry   Extremity: no edema, warm to touch        Data Review    Telemetry: normal sinus rhythm          Lab Data Personally Reviewed:    Recent Labs     03/07/22  0630 03/06/22  1131   WBC 14.2* 12.2*   HGB 12.4 13.4   HCT 38.0 39.7    230     Recent Labs     03/06/22  1249   APTT 35.9*      Recent Labs     03/07/22  0630 03/06/22  1131    138   K 3.6 3.1*    103   CO2 21 28   BUN 18 14   CREA 1.72* 1.30   * 153*   CA 8.5 8.8   MG 2.2  --      No results for input(s): CPK, CKNDX, TROIQ in the last 72 hours. No lab exists for component: CPKMB  Lab Results   Component Value Date/Time    Cholesterol, total 276 (H) 03/31/2021 09:26 AM    HDL Cholesterol 57 03/31/2021 09:26 AM    LDL, calculated 202.8 (H) 03/31/2021 09:26 AM    Triglyceride 81 03/31/2021 09:26 AM    CHOL/HDL Ratio 4.8 03/31/2021 09:26 AM       Recent Labs     03/06/22  1131   AP 48   TP 7.7   ALB 3.8   GLOB 3.9     No results for input(s): PH, PCO2, PO2 in the last 72 hours.     Medications Personally Reviewed:    Current Facility-Administered Medications   Medication Dose Route Frequency    metoprolol tartrate (LOPRESSOR) tablet 25 mg  25 mg Oral Q12H    lisinopriL (PRINIVIL, ZESTRIL) tablet 10 mg  10 mg Oral DAILY    amLODIPine (NORVASC) tablet 10 mg  10 mg Oral DAILY    rosuvastatin (CRESTOR) tablet 5 mg  5 mg Oral QHS    sodium chloride (NS) flush 5-40 mL  5-40 mL IntraVENous Q8H    sodium chloride (NS) flush 5-40 mL  5-40 mL IntraVENous PRN    acetaminophen (TYLENOL) tablet 650 mg  650 mg Oral Q6H PRN    Or    acetaminophen (TYLENOL) suppository 650 mg  650 mg Rectal Q6H PRN    polyethylene glycol (MIRALAX) packet 17 g  17 g Oral DAILY PRN    ondansetron (ZOFRAN ODT) tablet 4 mg  4 mg Oral Q8H PRN    Or    ondansetron (ZOFRAN) injection 4 mg  4 mg IntraVENous Q6H PRN    ticagrelor (BRILINTA) tablet 90 mg  90 mg Oral Q12H    aspirin chewable tablet 81 mg  81 mg Oral DAILY              Shanda Mcdaniel MD

## 2022-03-07 NOTE — CARDIO/PULMONARY
Cardiac Rehab Note: chart review/referral     03/06/2022 Patient status post cath with intervention    Smoking history assessed. Patient is a non smoker. Smoking Cessation Program link has been added to the AVS due to recent cessation. EF is not in chart. MI education folder, heart attack, heart heathy diet, warning signs, heart facts, catheterization brochure, and out patient cardiac rehab program to Serena Bates on 03/07/2022. Educated using teach back method. Reviewed MI diagnosis definition and purpose of intervention. Discussed risk factors for CAD to include the following: family history, elevated BMI, hyperlipidemia, hypertension, diabetes, and stress. Discussed Heart Healthy/Low Sodium (less than 2000 mg) diet. Reviewed the importance of medication compliance, follow up appointments with cardiologist, signs and symptoms of angina, and what to report to physician after discharge. Emphasized the value of cardiac rehab. Discussed Cardiac Rehab Program format, benefits, and encouraged enrollment to assist with risk modification and management. Patient did not comit to intake appointment. Patient provided orientation packet, instructed to complete packet a couple days prior to appointment, wear gym appropriate clothing and shoes, and bring current list of medications. Serena Bates verbalized understanding with questions answered.   Madiha Ricardo RN

## 2022-03-08 VITALS
RESPIRATION RATE: 16 BRPM | BODY MASS INDEX: 33.64 KG/M2 | TEMPERATURE: 98.9 F | SYSTOLIC BLOOD PRESSURE: 117 MMHG | DIASTOLIC BLOOD PRESSURE: 94 MMHG | WEIGHT: 235 LBS | OXYGEN SATURATION: 100 % | HEIGHT: 70 IN | HEART RATE: 75 BPM

## 2022-03-08 LAB
ANION GAP SERPL CALC-SCNC: 5 MMOL/L (ref 5–15)
BACTERIA SPEC CULT: NORMAL
BUN SERPL-MCNC: 18 MG/DL (ref 6–20)
BUN/CREAT SERPL: 13 (ref 12–20)
CALCIUM SERPL-MCNC: 8.7 MG/DL (ref 8.5–10.1)
CHLORIDE SERPL-SCNC: 103 MMOL/L (ref 97–108)
CO2 SERPL-SCNC: 26 MMOL/L (ref 21–32)
CREAT SERPL-MCNC: 1.41 MG/DL (ref 0.7–1.3)
ERYTHROCYTE [DISTWIDTH] IN BLOOD BY AUTOMATED COUNT: 13.1 % (ref 11.5–14.5)
GLUCOSE SERPL-MCNC: 99 MG/DL (ref 65–100)
HCT VFR BLD AUTO: 35.8 % (ref 36.6–50.3)
HGB BLD-MCNC: 12.1 G/DL (ref 12.1–17)
MCH RBC QN AUTO: 31.3 PG (ref 26–34)
MCHC RBC AUTO-ENTMCNC: 33.8 G/DL (ref 30–36.5)
MCV RBC AUTO: 92.7 FL (ref 80–99)
NRBC # BLD: 0 K/UL (ref 0–0.01)
NRBC BLD-RTO: 0 PER 100 WBC
PLATELET # BLD AUTO: 212 K/UL (ref 150–400)
PMV BLD AUTO: 10.6 FL (ref 8.9–12.9)
POTASSIUM SERPL-SCNC: 3.4 MMOL/L (ref 3.5–5.1)
RBC # BLD AUTO: 3.86 M/UL (ref 4.1–5.7)
SERVICE CMNT-IMP: NORMAL
SODIUM SERPL-SCNC: 134 MMOL/L (ref 136–145)
WBC # BLD AUTO: 11.3 K/UL (ref 4.1–11.1)

## 2022-03-08 PROCEDURE — 74011250637 HC RX REV CODE- 250/637: Performed by: STUDENT IN AN ORGANIZED HEALTH CARE EDUCATION/TRAINING PROGRAM

## 2022-03-08 PROCEDURE — 80048 BASIC METABOLIC PNL TOTAL CA: CPT

## 2022-03-08 PROCEDURE — 36415 COLL VENOUS BLD VENIPUNCTURE: CPT

## 2022-03-08 PROCEDURE — 74011250637 HC RX REV CODE- 250/637: Performed by: INTERNAL MEDICINE

## 2022-03-08 PROCEDURE — 74011250637 HC RX REV CODE- 250/637: Performed by: NURSE PRACTITIONER

## 2022-03-08 PROCEDURE — 74011000250 HC RX REV CODE- 250: Performed by: INTERNAL MEDICINE

## 2022-03-08 PROCEDURE — 85027 COMPLETE CBC AUTOMATED: CPT

## 2022-03-08 RX ORDER — ROSUVASTATIN CALCIUM 10 MG/1
10 TABLET, COATED ORAL
Qty: 30 TABLET | Refills: 5 | Status: SHIPPED | OUTPATIENT
Start: 2022-03-08 | End: 2022-05-26 | Stop reason: SDUPTHER

## 2022-03-08 RX ORDER — LISINOPRIL 5 MG/1
10 TABLET ORAL DAILY
Status: DISCONTINUED | OUTPATIENT
Start: 2022-03-09 | End: 2022-03-08 | Stop reason: HOSPADM

## 2022-03-08 RX ORDER — GUAIFENESIN 100 MG/5ML
81 LIQUID (ML) ORAL DAILY
Qty: 90 TABLET | Refills: 3 | Status: SHIPPED | OUTPATIENT
Start: 2022-03-09 | End: 2022-05-26 | Stop reason: SDUPTHER

## 2022-03-08 RX ORDER — LISINOPRIL 10 MG/1
10 TABLET ORAL DAILY
Qty: 30 TABLET | Refills: 5 | Status: SHIPPED | OUTPATIENT
Start: 2022-03-09 | End: 2022-05-26 | Stop reason: SDUPTHER

## 2022-03-08 RX ORDER — METOPROLOL TARTRATE 50 MG/1
50 TABLET ORAL EVERY 12 HOURS
Qty: 60 TABLET | Refills: 11 | Status: SHIPPED | OUTPATIENT
Start: 2022-03-08 | End: 2022-05-26 | Stop reason: SDUPTHER

## 2022-03-08 RX ORDER — POTASSIUM CHLORIDE 750 MG/1
20 TABLET, FILM COATED, EXTENDED RELEASE ORAL
Status: COMPLETED | OUTPATIENT
Start: 2022-03-08 | End: 2022-03-08

## 2022-03-08 RX ADMIN — TICAGRELOR 90 MG: 90 TABLET ORAL at 08:31

## 2022-03-08 RX ADMIN — ACETAMINOPHEN 650 MG: 325 TABLET ORAL at 04:07

## 2022-03-08 RX ADMIN — LISINOPRIL 5 MG: 5 TABLET ORAL at 08:31

## 2022-03-08 RX ADMIN — AMLODIPINE BESYLATE 10 MG: 5 TABLET ORAL at 08:31

## 2022-03-08 RX ADMIN — ASPIRIN 81 MG: 81 TABLET, CHEWABLE ORAL at 08:31

## 2022-03-08 RX ADMIN — METOPROLOL 50 MG: 50 TABLET ORAL at 08:31

## 2022-03-08 RX ADMIN — SODIUM CHLORIDE, PRESERVATIVE FREE 10 ML: 5 INJECTION INTRAVENOUS at 06:29

## 2022-03-08 RX ADMIN — POTASSIUM CHLORIDE 20 MEQ: 750 TABLET, EXTENDED RELEASE ORAL at 06:29

## 2022-03-08 NOTE — PROGRESS NOTES
Received message from patient's nurse stating:    Patient potassium with AM labs is 3.4. Discussion / orders:    Potassium chloride 20 mEq p.o. x1           Please note that this note was dictated using Dragon computer voice recognition software. Quite often unanticipated grammatical, syntax, homophones, and other interpretive errors are inadvertently transcribed by the computer software. Please disregard these errors. Please excuse any errors that have escaped final proofreading.      Signed by:  Stephania Bumpers, DNP, ACNP-BC

## 2022-03-08 NOTE — PROGRESS NOTES
0700- Shift report received from LICHA Garcia RN Report included the following information SBAR, Kardex, ED Summary, Procedure Summary, Intake/Output, MAR, and Recent Results    0800- Shift assessment complete, pt resting in bed. A&O x 4 and following commands appropriately. Made pt aware that he had a room on [facility] and educated on the transfer process. Pt stated he would \"prefer to go home rather than transfer rooms\". 6036- New orders noted for Rapid covid test. Pt is refusing to be swabbed. Dr. Azalea Malik notified. 2536- Pt still refusing rapid covid test and requesting to go home. Dr. Azalea Malik notified. 36- Dr. Azalea Malik at bedside, pt is visually upset and stating he is going home. Discharge orders placed. [address] [facility] with pt's wife, updated on plan of care. 200- Pt's wife at bedside. Discharge complete with pt and wife. All instructions given and all questions answered. Information regarding scripts and pharmacy given to pt.

## 2022-03-08 NOTE — PROGRESS NOTES
Progress Note      3/8/2022 8:07 AM  NAME: Hansa Silva   MRN:  [id number]   Admit Diagnosis: NSTEMI (non-ST elevated myocardial infarction) [facility]) [I21.4]       Assessment:     Problem list:   1. Non-ST elevation myocardial infarction S/P LHC, thrombotic occlusion of distal RCA and severe prox RCA disease  s/p PCI, significant disease of OM, large Diagonal branch, intermediate disease of LAD not significant by IFR but was Borderline    2. Systolic heart failure with EF 35-40%   3. Hypertension  4. Hyperlipidemia  5. Sarcoidosis  6. Noncompliance  7. Family history of CAD  8. 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 History of smoking, quit 1 month ago     Lives with wife, works for [employer]            Recommendations:     1. Continue aspirin and  ticagrelor   2. metoprolol to 50 mg po BID   3. Increase lisinopril to 10 mg daily   4. Cont amlodipine   5. Continue statin, side effects with lipitor and prava, will try crestor  6. Check lipid panels and hemoglobin A1c  7. DM mx per primary team   8. Address left sided disease as OP ; possibly FFR of LAD+/- intervention or medical therapy for diagonal /OM disease, or Stress testing to eval for ischemia burden. 9. Discussed about importance of compliance      Thank you for this consult and allowing me to take part in this patients care. Please call with questions.            Subjective:     HPI:   no CP OR SOB     ROS: No CP, SOB, Abd pain, nausea, vomiting, syncope, palpitations, new focal neurological symptoms     Objective:      Physical Exam:    Last 24hrs VS reviewed since prior progress note.  Most recent are:    Visit Vitals  /86 (BP 1 Location: Left upper arm, BP Patient Position: At rest)   Pulse 82   Temp 98.9 °F (37.2 °C)   Resp 15   Ht 5' 10\" (1.778 m)   Wt 106.6 kg (235 lb)   SpO2 100%   BMI 33.72 kg/m²       Intake/Output Summary (Last 24 hours) at 3/8/2022 0856  Last data filed at 3/8/2022 0700  Gross per 24 hour   Intake 1647.5 ml   Output 1675 ml   Net -27.5 ml       General: Alert and oriented x3, no acute distress   Neck: Supple   Respiratory: No respiratory distress, clear lung sound   Cardiovascular: Regular rate rhythm, S1S2, no murmur   Abdomen: soft, non tender, non distended   Neuro: moves all extremities, oriented x3   Skin: warm and dry   Extremity: no edema, warm to touch        Data Review    Telemetry: normal sinus rhythm          Lab Data Personally Reviewed:    Recent Labs     03/08/22  0355 03/07/22  0630   WBC 11.3* 14.2*   HGB 12.1 12.4   HCT 35.8* 38.0    207     Recent Labs     03/06/22  1249   APTT 35.9*      Recent Labs     03/08/22  0355 03/07/22  0630 03/06/22  1131   * 136 138   K 3.4* 3.6 3.1*    103 103   CO2 26 21 28   BUN 18 18 14   CREA 1.41* 1.72* 1.30   GLU 99 148* 153*   CA 8.7 8.5 8.8   MG  --  2.2  --      No results for input(s): CPK, CKNDX, TROIQ in the last 72 hours. No lab exists for component: CPKMB  Lab Results   Component Value Date/Time    Cholesterol, total 261 (H) 03/07/2022 03:58 PM    HDL Cholesterol 64 03/07/2022 03:58 PM    LDL, calculated 179.8 (H) 03/07/2022 03:58 PM    Triglyceride 86 03/07/2022 03:58 PM    CHOL/HDL Ratio 4.1 03/07/2022 03:58 PM       Recent Labs     03/06/22  1131   AP 48   TP 7.7   ALB 3.8   GLOB 3.9     No results for input(s): PH, PCO2, PO2 in the last 72 hours.     Medications Personally Reviewed:    Current Facility-Administered Medications   Medication Dose Route Frequency    [START ON 3/9/2022] lisinopriL (PRINIVIL, ZESTRIL) tablet 10 mg  10 mg Oral DAILY    0.9% sodium chloride infusion  75 mL/hr IntraVENous CONTINUOUS    metoprolol tartrate (LOPRESSOR) tablet 50 mg  50 mg Oral Q12H    amLODIPine (NORVASC) tablet 10 mg  10 mg Oral DAILY    rosuvastatin (CRESTOR) tablet 5 mg  5 mg Oral QHS    sodium chloride (NS) flush 5-40 mL  5-40 mL IntraVENous Q8H    sodium chloride (NS) flush 5-40 mL  5-40 mL IntraVENous PRN    acetaminophen (TYLENOL) tablet 650 mg  650 mg Oral Q6H PRN    Or    acetaminophen (TYLENOL) suppository 650 mg  650 mg Rectal Q6H PRN    polyethylene glycol (MIRALAX) packet 17 g  17 g Oral DAILY PRN    ondansetron (ZOFRAN ODT) tablet 4 mg  4 mg Oral Q8H PRN    Or    ondansetron (ZOFRAN) injection 4 mg  4 mg IntraVENous Q6H PRN    ticagrelor (BRILINTA) tablet 90 mg  90 mg Oral Q12H    aspirin chewable tablet 81 mg  81 mg Oral DAILY              Neo Griggs MD Hatchet Flap Text: The defect edges were debeveled with a #15 scalpel blade.  Given the location of the defect, shape of the defect and the proximity to free margins a hatchet flap was deemed most appropriate.  Using a sterile surgical marker, an appropriate hatchet flap was drawn incorporating the defect and placing the expected incisions within the relaxed skin tension lines where possible.    The area thus outlined was incised deep to adipose tissue with a #15 scalpel blade.  The skin margins were undermined to an appropriate distance in all directions utilizing iris scissors.

## 2022-03-08 NOTE — DISCHARGE INSTRUCTIONS
[address] [facility], [address]   [phone]  [address]    [email or web address]    Patient Discharge Instructions    Dave Joseph / [id number] : 1960    Admitted 3/6/2022 Discharged: 3/8/2022       You had heart arrack and had occluded heart artery on right side, that was opened with two drug stents. Please take aspirin and brilinta every day, these medications keep your stent open, not taking this medications may result in heart attack. DO NOT STOP THESE MEDICATIONS WITHOUT TALKING TO YOUR HEART DOCTOR. YOU HAVE MORE BLOCKAGES in arteries on left side, That will be addressed at later dates as outpatient. You heart function is weak, 35-40% (normally is 50-60%). You need to take medications on regular bases. That will be reassessed after 3 months. You are started on Crestor, cholesterol medications, that keeps blockages in heart artery stable and prevents heart attack. You are also started on metoprolol, that is good medications for heart. · It is important that you take the medication exactly as they are prescribed. · Keep your medication in the bottles provided by the pharmacist and keep a list of the medication names, dosages, and times to be taken in your wallet. · Do not take other medications without consulting your doctor. BRING ALL OF YOUR MEDICINES TO YOUR OFFICE VISIT with Beth Payton MD or my nurse practitioner. Follow-up with Beth Payton MD or my nurse practitioner in 2 weeks. Cardiac Catheterization  Discharge Instructions    Transradial Catheterization Discharge Instructions (WRIST)    Discharge instructions: Your radial artery in your wrist was used for your cardiac catheterization. This site may be slightly bruised and sore following your procedure. Expect mild tingling or the hand and tenderness at the puncture site for up to 3 days. Excess movement of the wrist used should be avoided for the next 24-48 hours. 1. No lifting over 2 pounds (approximately a ½ gallon of milk) with this arm for 24 hours. 2. Keep the site of the procedure covered with a bandage for 24 hours. 3. You may shower the day after your procedure. Do not take a tub bath or submerge the puncture site in water for 48 hours. 4. No heavy impact activity/lifting > 10 pounds for 1 week. If bleeding of the wrist occurs at home:   If the site on your wrist where you had the catheterization procedure begins to bleed, do not panic. 1. Place 1 or 2 fingers over the puncture site and hold pressure to stop the bleeding. You may be able to feel your pulse as you hold pressure. 2. Lift your fingers after 5 minutes to see if the bleeding has stopped. 3. Once the bleeding has stopped, gently wipe the wrist area clean and cover with a bandage. If the bleeding from your wrist does not stop after 15 minutes, or if there is a large amount of bleeding or spurting, call 911 immediately (do not drive yourself to the hospital). Other concerns: The site may be slightly bruised and sore following your procedure. Should any of the following occur, contact your physician immediately:   1. Any cool or coldness of the arm, discoloration over a large area, ongoing numbness or any abnormal sensations , moderate to severe pain or swelling in the arm. 2. Redness, soreness, swelling, chills or fever, or colored drainage at the procedure site within 3-7 days after your procedure. If you have any further questions or concerns regarding your procedure please call the Cardiac Cath Lab office at [phone]. During regular business hours ask to speak to Dr. Veda Centeno. During non-business hours the answering service will answer.  Ask to speak to the physician on call for [address] [place] [address] Cardiovascular Specialist.     Transfemoral Catheterization Discharge Instructions [facility])     Do not drive, operate any machinery, or sign any legal documents for 24 hours after your procedure. You must have someone to drive you home.  You may take a shower 24 hours after your cardiac catheterization. Be sure to get the dressing wet and then remove it; gently wash the area with warm soapy water. Pat dry and leave open to air. To help prevent infections, be sure to keep the cath site clean and dry. No lotions, creams, powders, ointments, etc. in the cath site for approximately 1 week.  Do not take a tub bath, get in a hot tub or swimming pool for approximately 5 days or until the cath site is completely healed.  No strenuous activity or heavy lifting over 10 lbs. for 7 days.  Drink plenty of fluids for 24-48 hours after your cath to flush the contrast dye from your kidneys. No alcoholic beverages for 24 hours. You may resume your previous diet (low fat, low cholesterol) after your cath.  After your cath, some bruising or discomfort is common during the healing process. Tylenol, 1-2 tablets every 6 hours as needed, is recommended if you experience any discomfort. If you experience any signs or symptoms of infection such as fever, chills, or poorly healing incision, persistent tenderness or swelling in the groin, redness and/or warmth to the touch, numbness, significant tingling or pain at the groin site or affected extremity, rash, drainage from the cath site, or if the leg feels tight or swollen, call your physician right away.  If bleeding at the cath site occurs, take a clean gauze pad and apply direct pressure to the groin just above the puncture site. Call 911 immediately, and continue to apply direct pressure until an ambulance gets to your location.  You may return to work  2  days after your cardiac cath if no groin bleeding. Information obtained by :  I understand that if any problems occur once I am at home I am to contact my physician. I understand and acknowledge receipt of the instructions indicated above. LICHA's Signature                                                                  Date/Time                                                                                                                                              Patient or Representative Signature                                                          Date/Time      Nixon Boone MD             [address] [facility], [facility] [address]    [phone]  [place], [address] [facility]    [email or web address]    Thank you for requesting access to [employer]. Please follow the instructions below to securely access and download your online medical record. [employer] allows you to send messages to your doctor, view your test results, renew your prescriptions, schedule appointments, and more. How Do I Sign Up? 1. In your internet browser, go to [email or web address]  2. Click on the First Time User? Click Here link in the Sign In box. You will be redirect to the New Member Sign Up page. 3. Enter your [employer] Access Code exactly as it appears below. You will not need to use this code after youve completed the sign-up process. If you do not sign up before the expiration date, you must request a new code. [employer] Access Code: Activation code not generated  Current [employer] Status: Active (This is the date your [employer] access code will )    4. Enter the last four digits of your Social Security Number (xxxx) and Date of Birth (mm/dd/yyyy) as indicated and click Submit. You will be taken to the next sign-up page. 5. Create a [employer] ID. This will be your [employer] login ID and cannot be changed, so think of one that is secure and easy to remember. 6. Create a [employer] password. You can change your password at any time. 7. Enter your Password Reset Question and Answer.  This can be used at a later time if you forget your password. 8. Enter your e-mail address. You will receive e-mail notification when new information is available in [address] 9. Click Sign Up. You can now view and download portions of your medical record. 10. Click the Download Summary menu link to download a portable copy of your medical information. Additional Information    If you have questions, please visit the Frequently Asked Questions section of the [employer] website at [email or web address] Remember, [employer] is NOT to be used for urgent needs. For medical emergencies, dial 911.

## 2022-03-08 NOTE — PROGRESS NOTES
Transition of Care Plan:Patient is being discharged today and states his wife will be here around noon today    RUR:6%    Disposition:Home with wife. Follow up appointments:Placed on AVS    DME needed:None    Transportation at Discharge: Wife    [address] or means to access home:  Wife has keys       IM Medicare Letter:N/A--Patient has [place] Medicaid    Is patient a BCPI-A Bundle: No         If yes, was Bundle Letter given?:  N/A    Is patient a  and connected with the [address] [facility]? No               If yes, was [employer] transfer form completed and [facility] notified? N/A    Caregiver Contact:Wife    Discharge Caregiver contacted prior to discharge? Caregiver contacted    Care Conference needed?:  No        Reason for Admission:  Patient came to ed for chest pain and sob. RUR Score:    6%                 Plan for utilizing home health:  He will not be using home health services. PCP: First and Last name:  Fauzia Douglas NP     Name of Practice: [facility]      Are you a current patient: Yes/No: Yes     Approximate date of last visit: Couple months ago     Can you participate in a virtual visit with your PCP: Yes                    Current Advanced Directive/Advance Care Plan: Full Code    Advance Care Planning     General Advance Care Planning (ACP) Conversation      Date of Conversation: 3/8/22  Conducted with: Patient with Decision Making Capacity    Healthcare Decision Maker:   No healthcare decision makers have been documented.    Click here to complete [address] including selection of the Healthcare Decision Maker Relationship (ie \"Primary\")        Content/Action Overview:   DECLINED ACP conversation - will revisit periodically   Reviewed DNR/DNI and patient elects Full Code (Attempt Resuscitation)         Length of Voluntary ACP Conversation in minutes:  <16 minutes (Non-Billable)    Sridhar Blank RN Healthcare Decision Maker:   Click here to complete [address] including selection of the Healthcare Decision Maker Relationship (ie \"Primary\")                             Confirmed demographics and pcp information with patient . He lives in a two story home with his wife. He states he was independent prior to coming to the hospital. He works for himself. He drives. He denies using home oxygen or cpap. He is being discharged home today with his wife. Halina Hernandez RN BSN CRM        [phone]

## 2022-03-08 NOTE — DISCHARGE SUMMARY
Hospitalist Discharge Summary     Patient ID:  Jermaine Donovan  [id number]  63 y.o.  1960  3/6/2022    PCP on record: Sadaf Laurent NP    Admit date: 3/6/2022  Discharge date and time: 3/8/2022    DISCHARGE DIAGNOSIS:    Non-ST elevation MI  Uncontrolled hypertension  Dyslipidemia  SIRS  Elevated creatinine  Former smoker:      CONSULTATIONS:  IP CONSULT TO CARDIOLOGY    Excerpted HPI from H&P of Randy Diallo MD:  Craig Hawkins is a 63 y.o.  male who presents with above complaints from home with wife. Patient present with chief complaint of substernal chest pain/heaviness since last night  History of associated shortness of breath  Chest discomfort described as constant, associated with nausea and 2 episodes of emesis with associated diaphoresis. Patient has history of hypertension and hyperlipidemia, is on antihypertensive meds but has stopped taking Lipitor due to side effects as per patient. Patient was found to have high sensitive troponin at [address]149 on work-up in ED with EKG showing inferolateral ischemia, and blood work revealing mild hypokalemia at 3.1     We were asked to admit for work up and evaluation of the above problems. ______________________________________________________________________  DISCHARGE SUMMARY/HOSPITAL COURSE:  for full details see H&P, daily progress notes, labs, consult notes.      Non-ST elevation MI  Uncontrolled hypertension  Dyslipidemia   S/P LHC, thrombotic occlusion of distal RCA and severe prox RCA disease, s/p PCI, significant disease of OM, large Diagonal branch, intermediate disease of LAD not significant by IFR but was orderline    Continue aspirin, Brilinta, statin, beta-blocker  Continue lisinopril and amlodipine  Lipid panel shows , Atorvastatin transitioned to Crestor  Recent hemoglobin A1c is 5.8  Echocardiogram showed preserved EF  Currently chest pain-free     SIRS  -Patient had a fever of 102 and leukocytosis, still febrile  -UA negative, Chest xray negative  -Pt refused Covid 19 testing  -He wants to go home  -Blood cx NGTD at 24 hours  -He does not want to stay in hospital although he was informed that consant high grade fever could be a sign of serious infection and can lead to septic shock  -Pt wants to leave AMA  -No clear source signs of infection     Elevated creatinine  -Presented with creatinine of 1.3, creatinine  up to 1.7, TRENDED DOWN TO 1.4  -Likely due to contrast received during heart cath  -STOP ivf     Former smoker:  -Quit smoking about a month ago           _______________________________________________________________________  Patient seen and examined by me on discharge day. Pertinent Findings:  Gen:    Not in distress  Chest: Clear lungs  CVS:   Regular rhythm. No edema  Abd:  Soft, not distended, not tender  Neuro:  Alert, ORIENTED X4  _______________________________________________________________________  DISCHARGE MEDICATIONS:   Current Discharge Medication List      START taking these medications    Details   aspirin 81 mg chewable tablet Take 1 Tablet by mouth daily. Qty: 90 Tablet, Refills: 3  Start date: 3/9/2022      metoprolol tartrate (LOPRESSOR) 50 mg tablet Take 1 Tablet by mouth every twelve (12) hours. Qty: 60 Tablet, Refills: 11  Start date: 3/8/2022      rosuvastatin (CRESTOR) 10 mg tablet Take 1 Tablet by mouth nightly. Qty: 30 Tablet, Refills: 5  Start date: 3/8/2022      ticagrelor (BRILINTA) 90 mg tablet Take 1 Tablet by mouth every twelve (12) hours every twelve (12) hours. Qty: 60 Tablet, Refills: 11  Start date: 3/8/2022         CONTINUE these medications which have CHANGED    Details   lisinopriL (PRINIVIL, ZESTRIL) 10 mg tablet Take 1 Tablet by mouth daily. Qty: 30 Tablet, Refills: 5  Start date: 3/9/2022         CONTINUE these medications which have NOT CHANGED    Details   amLODIPine (NORVASC) 10 mg tablet Take 1 Tablet by mouth daily.   Qty: 90 Tablet, Refills: 0    Comments: Dc hctz  Associated Diagnoses: Essential hypertension      Blood-Glucose Meter (FREESTYLE FREEDOM LITE) monitoring kit Check blood sugar daily  Qty: 1 Kit, Refills: 0    Associated Diagnoses: Prediabetes         STOP taking these medications       hydroCHLOROthiazide (HYDRODIURIL) 25 mg tablet Comments:   Reason for Stopping:         ibuprofen (MOTRIN) 600 mg tablet Comments:   Reason for Stopping:         atorvastatin (LIPITOR) 20 mg tablet Comments:   Reason for Stopping:         sildenafil citrate (Viagra) 100 mg tablet Comments:   Reason for Stopping:                 Patient Follow Up Instructions: Activity: Activity as tolerated  Diet: Cardiac Diet  Wound Care: None needed    Follow-up with PCP and Cardiology in 1-4 weeks.   Follow-up tests/labs  none  Follow-up Information     Follow up With Specialties Details Why Contact Info    Candice Gould MD Cardiology, Internal Medicine In 2 weeks  [address] [facility]  [address]  [address] [facility] Mow., NP Nurse Practitioner In 1 week  [address]  [phone]          ________________________________________________________________    Risk of deterioration: High    Condition at Discharge:  Stable  __________________________________________________________________    Disposition  Leaving AMA    ____________________________________________________________________    Code Status: Full Code  ___________________________________________________________________      Total time in minutes spent coordinating this discharge (includes going over instructions, follow-up, prescriptions, and preparing report for sign off to her PCP) :  40 minutes    Signed:  Vicente Jefferson MD

## 2022-03-09 ENCOUNTER — PATIENT OUTREACH (OUTPATIENT)
Dept: CASE MANAGEMENT | Age: 62
End: 2022-03-09

## 2022-03-09 NOTE — PROGRESS NOTES
Care Transitions Initial Call    Call within 2 business days of discharge: Yes     Patient: Suzette Snider Patient : 1960 MRN: [id number]    [facility]       Complaint Diagnosis Description Type Department Provider    3/6/22 Chest Pain; Back Pain NSTEMI (non-ST elevated myocardial infarction) [facility]) ED to Hosp-Admission (Discharged) (ADMIT) Faraz Silva MD; Stephan, ... Was this an external facility discharge? Yes,  ED [facility] 3/6-3/8    Discharge Facility    Challenges to be reviewed by the provider   Additional needs identified to be addressed with provider: yes  ED [facility] 3/6-3/8 NSTEMI, left AMA with fever at times. Refusted Covid testing. Currently fever free. Method of communication with provider : chart routing    Discussed [phone] related testing which was not done at this time. Patient refused. Advance Care Planning:   Does patient have an Advance Directive:  currently not on file; patient declined education    Inpatient Readmission Risk score: Unplanned Readmit Risk Score: 6.2 ( )    Was this a readmission? no   Patient stated reason for the admission: chest pain, weight on chest.    Patients top risk factors for readmission: medical condition-NSTEMI, uncontrolled HTN,prior smoker,Hypercholesteremia. Interventions to address risk factors: Scheduled appointment with PCP-NP Shahana Estrada 3/11 at 2:30pm for VV, Scheduled appointment with Specialist-cardio, [address] [place] [address] office to call patient with appt info. and Obtained and reviewed discharge summary and/or continuity of care documents    Care Transition Nurse (CTN) contacted the patient by telephone to perform post hospital discharge assessment. Verified name and  with patient as identifiers. Provided introduction to self, and explanation of the CTN role. Spoke at length with patient, he kept digressing off the subject, needing redirection.  Reports no current chest pain, right hand swollen from heart catheterization. States he insisted on coming home because couldn't sleep with the beeping from all of the machines. Reminded of VV with pcp for JASON on 3/11 at 2:30pm. Did apologize to staff at [facility], said they were all excellent with him and gave him excellent care. CTN reviewed discharge instructions, medical action plan and red flags with patient who verbalized understanding. Were discharge instructions available to patient? yes. Reviewed appropriate site of care based on symptoms and resources available to patient including: PCP, Specialist, Urgent Care Clinics and When to call 911. Patient given an opportunity to ask questions and does not have any further questions or concerns at this time. The patient agrees to contact the PCP office for questions related to their healthcare. Medication reconciliation was performed with patient, who verbalizes understanding of administration of home medications. Advised obtaining a 90-day supply of all daily and as-needed medications. Referral to Pharm D needed: no     Home Health/Outpatient orders at discharge: [address]: na  Date of initial visit: na    Durable Medical Equipment ordered at discharge: None  [address]: na  Durable Medical Equipment received: na    Covid Risk Education    Educated patient about risk for severe COVID-19 due to risk factors according to CDC guidelines. CTN reviewed discharge instructions, medical action plan and red flag symptoms with the patient who verbalized understanding. Discussed COVID vaccination status: yes. Education provided on COVID-19 vaccination as appropriate. Discussed exposure protocols and quarantine with CDC Guidelines. Patient was given an opportunity to verbalize any questions and concerns and agrees to contact CTN or health care provider for questions related to their healthcare. Was patient discharged with a pulse oximeter? no.     Discussed follow-up appointments.  If no appointment was previously scheduled, appointment scheduling offered: yes. Is follow up appointment scheduled within 7 days of discharge? yes. [address] follow up appointment(s):   Future Appointments   Date Time Provider Sarbjit Aldridge   3/11/2022  2:30 PM [facility]     Non-[facility] follow up appointment(s): Dr. Rashawn Amin, office to call patient with appt date/time. Plan for follow-up call in 7-10 days based on severity of symptoms and risk factors. Plan for next call: symptom management-assess current symptoms, self management-following discharge instructions, follow up appointment-saw pcp for JASON visit and medication management-taking meds as ordered. CTN provided contact information for future needs. Goals Addressed                 This Visit's Progress     Prevent complications post hospitalization. 3/9/22 [facility] 3/6-3/8 NSTEMI   Reviewed discharge instructions with patient using teachback.  Reviewed meds, education provided on new meds, using teachback.  Reviewed red flags: chest pain,sob, dizziness,weakness,fever,nausea,vomiting,diarrhea.  JASON with pcp 3/11 NP Marie Kahn, VV at 2:30pm.  Angelo Wadsworth, office to call with appt info.  Given CTN contact info if questions/concerns. [address] as a resource.  Encouraged heart healthy diet, low salt, fresh fruit/veggies, lean protein.  CTN to check back in about a week. caesar

## 2022-03-11 ENCOUNTER — VIRTUAL VISIT (OUTPATIENT)
Dept: INTERNAL MEDICINE CLINIC | Age: 62
End: 2022-03-11
Payer: MEDICAID

## 2022-03-11 DIAGNOSIS — F22: ICD-10-CM

## 2022-03-11 DIAGNOSIS — I21.4 NSTEMI (NON-ST ELEVATED MYOCARDIAL INFARCTION) (HCC): ICD-10-CM

## 2022-03-11 DIAGNOSIS — M79.89 LEG SWELLING: ICD-10-CM

## 2022-03-11 DIAGNOSIS — I10 ESSENTIAL HYPERTENSION: Primary | ICD-10-CM

## 2022-03-11 PROCEDURE — 99442 PR PHYS/QHP TELEPHONE EVALUATION 11-20 MIN: CPT | Performed by: NURSE PRACTITIONER

## 2022-03-11 NOTE — PROGRESS NOTES
Chief Complaint   Patient presents with   [facility] Follow Up     MR[facility] - pt states he is not recovering well, he discharged himself from hosp early and needs help getting toxins out of his body - right leg and feet are swollen - pt states right knee looks \"altered\"     Other     phone call only [phone]       1. \"Have you been to the ER, urgent care clinic since your last visit? Hospitalized since your last visit? \" Yes When: 3/6/22-3/8/22 Where: [facility] Reason for visit: NSTEMI    2. \"Have you seen or consulted any other health care providers outside of the [address] [place] [address] since your last visit? \" No     3. For patients aged 39-70: Has the patient had a colonoscopy / FIT/ Cologuard? No      If the patient is female:    4. For patients aged 41-77: Has the patient had a mammogram within the past 2 years? NA - based on age or sex      11. For patients aged 21-65: Has the patient had a pap smear?  NA - based on age or sex

## 2022-03-11 NOTE — PROGRESS NOTES
Alejandro Roman is a 63 y.o. male, evaluated via audio-only technology on 3/11/2022 for Hospital Follow Up ([facility] - pt states he is not recovering well, he discharged himself from hosp early and needs help getting toxins out of his body - right leg and feet are swollen - pt states right knee looks \"altered\" ) and Other (phone call only [phone])  . Assessment & Plan:   Diagnoses and all orders for this visit:    1. Essential hypertension    2. NSTEMI (non-ST elevated myocardial infarction) ([facility] [address]    3. 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 Leg swelling      The complexity of medical decision making for this visit is moderate     Advised ED if leg worsening r/o dvt  Advised fu in office next week to gerardo   Discussed nature of heart disease and risks of non compliance  Broached the subject of psychiatry, he states possibly  Will eval in office further  He states he has cardio fu in 2 weeks  12  Subjective:       Prior to Admission medications    Medication Sig Start Date End Date Taking? Authorizing Provider   lisinopriL (PRINIVIL, ZESTRIL) 10 mg tablet Take 1 Tablet by mouth daily. 3/9/22   Laurita Costello MD   aspirin 81 mg chewable tablet Take 1 Tablet by mouth daily. 3/9/22   Laurita Costello MD   metoprolol tartrate (LOPRESSOR) 50 mg tablet Take 1 Tablet by mouth every twelve (12) hours. 3/8/22   Laurita Costello MD   rosuvastatin (CRESTOR) 10 mg tablet Take 1 Tablet by mouth nightly. 3/8/22   Laurita Costello MD   ticagrelor (BRILINTA) 90 mg tablet Take 1 Tablet by mouth every twelve (12) hours every twelve (12) hours. 3/8/22   Laurita Costello MD   amLODIPine (NORVASC) 10 mg tablet Take 1 Tablet by mouth daily.  2/2/22   Eunice Connell., NP   Blood-Glucose Meter (FREESTYLE FREEDOM LITE) monitoring kit Check blood sugar daily 10/5/15   Louie River MD   glucose blood VI test strips (FREESTYLE LITE STRIPS) strip Check blood sugar daily 10/5/15   Louie River MD     Patient Active Problem List   Diagnosis Code    Hypertension I10    ED (erectile dysfunction) N52.9    Hyperlipidemia E78.5    Depression F32. 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 (HCC) F22    Delusions of parasitosis (HCC) F22    Paranoia querulans (HCC) F22    Noncompliance Z91.19    Essential hypertension I10    Sciatica M54.30    CKD (chronic kidney disease) stage 3, GFR 30-59 ml/min (HCC) N18.30    NSTEMI (non-ST elevated myocardial infarction) ([facility] [address] I21.4       [facility] follow up for NSTEMI  S/P LHC, thrombotic occlusion of distal RCA and severe prox RCA disease, s/p PCI, significant disease of OM, large Diagonal branch, intermediate disease of LAD not significant by IFR but was orderline    Continue aspirin, Brilinta, statin, beta-blocker  Continue lisinopril and amlodipine  Lipid panel shows , Atorvastatin transitioned to Crestor  Recent hemoglobin A1c is 5.8  Echocardiogram showed preserved EF      Fever: pt left AMA  Labs unremarkable for cause, pt refused covid test    He denies fever    He states he checked home /80's  He has meds    He reports unilateral leg swelling and knee pain  He denies warmth/redness  He states that is seems to be a little better, some improvement w/ elevation      He is tangential in speech          Patient-Reported Systolic (Top): 470  Patient-Reported Diastolic (Bottom): 80       Arnie Mathur was evaluated through a patient-initiated, synchronous (real-time) audio only encounter. He (or guardian if applicable) is aware that it is a billable service, which includes applicable co-pays, with coverage as determined by his insurance carrier. This visit was conducted with the patient's (and/or Michael Cleaves guardian's) verbal consent.  He has not had a related appointment within my department in the past 7 days or scheduled within the next 24 hours. The patient was located in a state where the provider was licensed to provide care. Total Time: minutes: 5-10 minutes    Gina Strong NP

## 2022-03-12 LAB
BACTERIA SPEC CULT: NORMAL
SERVICE CMNT-IMP: NORMAL

## 2022-03-16 ENCOUNTER — PATIENT OUTREACH (OUTPATIENT)
Dept: CASE MANAGEMENT | Age: 62
End: 2022-03-16

## 2022-03-17 NOTE — PROGRESS NOTES
Called 3/16 and 3/17 and LM requesting patient call me back with update. 10-20 Med[employer] message sent as well. Will continue to try and reach.

## 2022-03-19 PROBLEM — I21.4 NSTEMI (NON-ST ELEVATED MYOCARDIAL INFARCTION) (HCC): Status: ACTIVE | Noted: 2022-03-06

## 2022-03-19 PROBLEM — N18.30 CKD (CHRONIC KIDNEY DISEASE) STAGE 3, GFR 30-59 ML/MIN (HCC): Status: ACTIVE | Noted: 2020-06-13

## 2022-03-21 ENCOUNTER — PATIENT OUTREACH (OUTPATIENT)
Dept: CASE MANAGEMENT | Age: 62
End: 2022-03-21

## 2022-03-21 NOTE — PROGRESS NOTES
Had called and LM for patient 3/16 and 3/17. He called back 3/21. Goals      Prevent complications post hospitalization. 3/9/22 [address] 3/6-3/8 NSTEMI   Reviewed discharge instructions with patient using teachback.  Reviewed meds, education provided on new meds, using teachback.  Reviewed red flags: chest pain,sob, dizziness,weakness,fever,nausea,vomiting,diarrhea.  JASON with pcp 3/11 NP Brittany Coppola, VV at 2:30pm.  Grecia Cuba, office to call with appt info.  Given CTN contact info if questions/concerns. [address] as a resource.  Encouraged heart healthy diet, low salt, fresh fruit/veggies, lean protein.  CTN to check back in about a week. mbt  3/21/22  Called and spoke to patient. Says he is \"alive\", it is a slow go. No chest pain. Knee stopped aching. Tangential in speech, talking about politics, [employer],etc.  Next pcp visit 3/24. Has not made f/u with cardio, . Urged to call and schedule appt to see him for f/u. Advised CTN to check back in about a week. mbt

## 2022-03-29 ENCOUNTER — PATIENT OUTREACH (OUTPATIENT)
Dept: CASE MANAGEMENT | Age: 62
End: 2022-03-29

## 2022-04-08 ENCOUNTER — PATIENT OUTREACH (OUTPATIENT)
Dept: CASE MANAGEMENT | Age: 62
End: 2022-04-08

## 2022-04-08 NOTE — PROGRESS NOTES
Patient has graduated from the Transitions of Care Coordination  program on 4/8/22. Patient/family has the ability to self-manage at this time Care management goals have been completed. Patient was not referred to the [facility] team for further management. Goals Addressed                 This Visit's Progress     COMPLETED: Prevent complications post hospitalization. 3/9/22 [facility] 3/6-3/8 NSTEMI   Reviewed discharge instructions with patient using teachback.  Reviewed meds, education provided on new meds, using teachback.  Reviewed red flags: chest pain,sob, dizziness,weakness,fever,nausea,vomiting,diarrhea.  JASON with pcp 3/11 NP Arlet Truong, VV at 2:30pm.  Liu Tabor, office to call with appt info.  Given CTN contact info if questions/concerns. [address] as a resource.  Encouraged heart healthy diet, low salt, fresh fruit/veggies, lean protein.  CTN to check back in about a week. mbt  3/21/22  Called and spoke to patient. Says he is \"alive\", it is a slow go. No chest pain. Knee stopped aching. Tangential in speech, talking about politics, [employer],etc.  Next pcp visit 3/24. Has not made f/u with cardio, . Urged to call and schedule appt to see him for f/u. Advised CTN to check back in about a week. mbt  3/29/22  Reports he is ok. Better, saw Agustin Waldron 3/25- bp was up a little. Cardio advised him to restart the HCTZ. Edema/swelling has gone down. Denies any chest pain, no sob. Knee better. Became tangential again- talking about neighbors,pit bulls,politics,[employer]. Reminded to check bp and record. Report elevated numbers to cardio for recs. CTN to check back in about a week. mbt  4/8/22  Says he is ok, little weak off and on. Says no chest pain, no sob. No energy. Says doesn't have much of an appetite. Did eat a better breakfast today. Encouraged to focus on heart healthy diet- fresh veggies/fruits, lean protein.   Gradually build up exercise, walking is excellent choice. F/u with  in 6 months as advised, sooner if needed. Continue routine f/u with pcp. Wished him well.mbt            Patient has Care Transition Nurse's contact information for any further questions, concerns, or needs. Patients upcoming visits:  No future appointments.

## 2022-05-26 ENCOUNTER — OFFICE VISIT (OUTPATIENT)
Dept: INTERNAL MEDICINE CLINIC | Age: 62
End: 2022-05-26
Payer: MEDICAID

## 2022-05-26 ENCOUNTER — HOSPITAL ENCOUNTER (OUTPATIENT)
Dept: GENERAL RADIOLOGY | Age: 62
Discharge: HOME | End: 2022-05-26
Payer: MEDICAID

## 2022-05-26 VITALS
DIASTOLIC BLOOD PRESSURE: 80 MMHG | SYSTOLIC BLOOD PRESSURE: 113 MMHG | OXYGEN SATURATION: 98 % | HEART RATE: 73 BPM | BODY MASS INDEX: 33.07 KG/M2 | WEIGHT: 231 LBS | RESPIRATION RATE: 18 BRPM | HEIGHT: 70 IN | TEMPERATURE: 97.4 F

## 2022-05-26 DIAGNOSIS — M25.561 CHRONIC PAIN OF RIGHT KNEE: ICD-10-CM

## 2022-05-26 DIAGNOSIS — I10 ESSENTIAL HYPERTENSION: Primary | ICD-10-CM

## 2022-05-26 DIAGNOSIS — G89.29 CHRONIC PAIN OF RIGHT KNEE: ICD-10-CM

## 2022-05-26 DIAGNOSIS — E78.2 MIXED HYPERLIPIDEMIA: ICD-10-CM

## 2022-05-26 DIAGNOSIS — I21.4 NSTEMI (NON-ST ELEVATED MYOCARDIAL INFARCTION) (HCC): ICD-10-CM

## 2022-05-26 DIAGNOSIS — F34.1 DYSTHYMIA: ICD-10-CM

## 2022-05-26 PROCEDURE — 73562 X-RAY EXAM OF KNEE 3: CPT

## 2022-05-26 PROCEDURE — 99214 OFFICE O/P EST MOD 30 MIN: CPT | Performed by: NURSE PRACTITIONER

## 2022-05-26 RX ORDER — ACETAMINOPHEN 500 MG
500 TABLET ORAL
Qty: 60 TABLET | Refills: 0 | Status: SHIPPED | OUTPATIENT
Start: 2022-05-26 | End: 2022-06-01

## 2022-05-26 RX ORDER — LIDOCAINE 50 MG/G
1 PATCH TOPICAL EVERY 12 HOURS
Qty: 30 EACH | Refills: 1 | Status: SHIPPED | OUTPATIENT
Start: 2022-05-26 | End: 2022-08-04

## 2022-05-26 RX ORDER — DICLOFENAC SODIUM 10 MG/G
2 GEL TOPICAL 4 TIMES DAILY
Qty: 100 EACH | Refills: 0 | Status: SHIPPED | OUTPATIENT
Start: 2022-05-26 | End: 2022-07-21

## 2022-05-26 RX ORDER — LISINOPRIL 10 MG/1
10 TABLET ORAL DAILY
Qty: 90 TABLET | Refills: 1 | Status: SHIPPED | OUTPATIENT
Start: 2022-05-26

## 2022-05-26 RX ORDER — METOPROLOL TARTRATE 50 MG/1
50 TABLET ORAL EVERY 12 HOURS
Qty: 180 TABLET | Refills: 0 | Status: SHIPPED | OUTPATIENT
Start: 2022-05-26

## 2022-05-26 RX ORDER — GUAIFENESIN 100 MG/5ML
81 LIQUID (ML) ORAL DAILY
Qty: 90 TABLET | Refills: 3 | Status: SHIPPED | OUTPATIENT
Start: 2022-05-26

## 2022-05-26 RX ORDER — AMLODIPINE BESYLATE 10 MG/1
10 TABLET ORAL DAILY
Qty: 90 TABLET | Refills: 0 | Status: SHIPPED | OUTPATIENT
Start: 2022-05-26 | End: 2022-08-04

## 2022-05-26 RX ORDER — ROSUVASTATIN CALCIUM 10 MG/1
10 TABLET, COATED ORAL
Qty: 90 TABLET | Refills: 1 | Status: SHIPPED | OUTPATIENT
Start: 2022-05-26

## 2022-05-26 RX ORDER — PETROLATUM 42 G/100G
1 OINTMENT TOPICAL AS NEEDED
Qty: 100 G | Refills: 1 | Status: SHIPPED | OUTPATIENT
Start: 2022-05-26 | End: 2022-05-26

## 2022-05-26 NOTE — PROGRESS NOTES
Subjective: (As above and below)     Chief Complaint   Patient presents with    Follow-up     sciatica, heart attack     Medication Refill     pt is requesting 90 day rx    Request For New Medication     morphine sulfat/ aquaphor ointment // sciatica - pt states he cannot sleep at night because of leg pain      Daniele Shu is a 63y.o. year old male who presents for     Hypertension ROS:  taking medications as instructed, no medication side effects noted, no TIAs, no chest pain on exertion, no dyspnea on exertion, no swelling of ankles    Hyperlipidemia; tolerating statin      NSTEMI: follows w cardio reports taking meds    R knee pain and swelling: since NSTEMI, feels instability so using crutches at times  There is swelling no warmth/redness  He is worried he will fall    Psychiatric diagnosis: per chart hx of delusional disorder  He is very educated and passionate about politics/civil rights, he speaks about this a lot during visits. He shows no delusions today, he does have life stressors which are concerning as a contributory factor to his health  He does not want to see psychiatry but may consider therapy  His wife is with him in the office today      Reviewed PmHx, RxHx, FmHx, SocHx, AllgHx and updated in chart.   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    Prediabetes 5/18/2014    Sarcoidosis 5/18/2014    Sarcoidosis, lung ([address]     Sciatica of right side 5/18/2014    Xerosis of skin 5/18/2014      Social History     Socioeconomic History    Marital status:    Tobacco Use    Smoking status: Former Smoker     Quit date: 2010     Years since quittin.4    Smokeless tobacco: Never Used   Vaping Use    Vaping Use: Never used   Substance and Sexual Activity    Alcohol use: Not Currently    Drug use: Yes     Types: Prescription, OTC    Sexual activity: Yes     Partners: Male          Current Outpatient Medications   Medication Sig    amLODIPine (NORVASC) 10 mg tablet Take 1 Tablet by mouth daily.  aspirin 81 mg chewable tablet Take 1 Tablet by mouth daily.  lisinopriL (PRINIVIL, ZESTRIL) 10 mg tablet Take 1 Tablet by mouth daily.  metoprolol tartrate (LOPRESSOR) 50 mg tablet Take 1 Tablet by mouth every twelve (12) hours.  rosuvastatin (CRESTOR) 10 mg tablet Take 1 Tablet by mouth nightly.  ticagrelor (BRILINTA) 90 mg tablet Take 1 Tablet by mouth every twelve (12) hours every twelve (12) hours.  acetaminophen (TYLENOL) 500 mg tablet Take 1 Tablet by mouth every six (6) hours as needed for Pain.  diclofenac (VOLTAREN) 1 % gel Apply 2 g to affected area four (4) times daily.  lidocaine (LIDODERM) 5 % 1 Patch by TransDERmal route every twelve (12) hours. Apply patch to the affected area for 12 hours a day and remove for 12 hours a day.  Blood-Glucose Meter (FREESTYLE FREEDOM LITE) monitoring kit Check blood sugar daily    glucose blood VI test strips (FREESTYLE LITE STRIPS) strip Check blood sugar daily     No current facility-administered medications for this visit. Review of Systems:   Constitutional:    Negative for fever and chills, negative diaphoresis. HEENT:              Negative for neck pain and stiffness. Eyes:                  Negative for visual disturbance, itching, redness or discharge. Respiratory:        Negative for cough and shortness of breath. Cardiovascular:  Negative for chest pain and palpitations.    Gastrointestinal: Negative for nausea, vomiting, abdominal pain, diarrhea or constipation. Genitourinary:     Negative for dysuria and frequency. Musculoskeletal: Negative for falls, tenderness and swelling. Skin:                    Negative for rash, masses or lesions. Neurological:       Negative for dizzyness, seizure, loss of consciousness, weakness and numbness. Objective:     Vitals:    05/26/22 0919   BP: 113/80   Pulse: 73   Resp: 18   Temp: 97.4 °F (36.3 °C)   TempSrc: Temporal   SpO2: 98%   Weight: 231 lb (104.8 kg)   Height: 5' 10\" (1.778 m)         Gen: Oriented to person, place and time and well-developed, well-nourished and in no distress. HEENT:    Head: normocephalic and atraumatic. Eyes:  EOM are normal. Pupils equal and round. Neck:  Normal range of motion. Neck supple. Cardiovascular: normal rate, regular rhythm and normal heart sounds. Pulmonary/Chest:  Effort normal and breath sounds normal.  No respiratory distress. No wheezes, no rales. Abdominal: soft, normal  bowel sounds. Musculoskeletal:  No edema, no tenderness. No calf tenderness or edema. Neurological:  Alert, oriented to person, place and time. Skin: skin is warm and dry. Assessment/ Plan:     Follow-up and Dispositions    · Return in about 4 months (around 9/26/2022). 1. Essential hypertension    - amLODIPine (NORVASC) 10 mg tablet; Take 1 Tablet by mouth daily. Dispense: 90 Tablet; Refill: 0    2. Dysthymia    - REFERRAL TO PSYCHIATRY    3. Chronic pain of right knee    - acetaminophen (TYLENOL) 500 mg tablet; Take 1 Tablet by mouth every six (6) hours as needed for Pain. Dispense: 60 Tablet; Refill: 0  - diclofenac (VOLTAREN) 1 % gel; Apply 2 g to affected area four (4) times daily. Dispense: 100 Each; Refill: 0  - XR KNEE RT MAX 2 VWS; Future  - REFERRAL TO PHYSICAL THERAPY  - lidocaine (LIDODERM) 5 %; 1 Patch by TransDERmal route every twelve (12) hours. Apply patch to the affected area for 12 hours a day and remove for 12 hours a day.   Dispense: 30 Each; Refill: 1    4. NSTEMI (non-ST elevated myocardial infarction) ([facility]  F/u cardio    5. Mixed hyperlipidemia            I have discussed the diagnosis with the patient and the intended plan as seen in the above orders. The patient has received an after-visit summary and questions were answered concerning future plans. Pt conveyed understanding of plan. Medication Side Effects and Warnings were discussed with patient: yes  Patient Labs were reviewed: yes  Patient Past Records were reviewed:  yes    Melissa Carrillo.  Fernando Snyder NP

## 2022-05-26 NOTE — PROGRESS NOTES
Chief Complaint   Patient presents with    Follow-up     sciatica, heart attack     Medication Refill     pt is requesting 90 day rx    Request For New Medication     morphine sulfat/ aquaphor ointment // sciatica - pt states he cannot sleep at night because of leg pain        1. \"Have you been to the ER, urgent care clinic since your last visit? Hospitalized since your last visit? \" No    2. \"Have you seen or consulted any other health care providers outside of the [address] [place] [address] since your last visit? \" No     3. For patients aged 39-70: Has the patient had a colonoscopy / FIT/ Cologuard? Yes - Care Gap present. Most recent result on file      If the patient is female:    4. For patients aged 41-77: Has the patient had a mammogram within the past 2 years? NA - based on age or sex      11. For patients aged 21-65: Has the patient had a pap smear?  NA - based on age or sex

## 2022-05-31 DIAGNOSIS — G89.29 CHRONIC PAIN OF RIGHT KNEE: ICD-10-CM

## 2022-05-31 DIAGNOSIS — M25.561 CHRONIC PAIN OF RIGHT KNEE: ICD-10-CM

## 2022-06-01 RX ORDER — ACETAMINOPHEN 500 MG
TABLET ORAL
Qty: 60 TABLET | Refills: 0 | Status: SHIPPED | OUTPATIENT
Start: 2022-06-01 | End: 2022-08-04

## 2022-06-06 ENCOUNTER — TELEPHONE (OUTPATIENT)
Dept: INTERNAL MEDICINE CLINIC | Age: 62
End: 2022-06-06

## 2022-06-06 NOTE — TELEPHONE ENCOUNTER
----- Message from [facility] sent at 6/6/2022 12:38 PM EDT -----  Subject: Message to Provider    QUESTIONS  Information for Provider? Patient is calling for results of xray that was   done over a week ago, patient would like a call and have his results   mailed to his house.  ---------------------------------------------------------------------------  --------------  [address]  What is the best way for the office to contact you? OK to leave message on   voicemail  Preferred Call Back Phone Number?  [phone]  ---------------------------------------------------------------------------  --------------  SCRIPT ANSWERS  undefined

## 2022-07-20 DIAGNOSIS — G89.29 CHRONIC PAIN OF RIGHT KNEE: ICD-10-CM

## 2022-07-20 DIAGNOSIS — M25.561 CHRONIC PAIN OF RIGHT KNEE: ICD-10-CM

## 2022-07-21 RX ORDER — DICLOFENAC SODIUM 10 MG/G
GEL TOPICAL
Qty: 100 G | Refills: 1 | Status: SHIPPED | OUTPATIENT
Start: 2022-07-21

## 2022-07-26 ENCOUNTER — OFFICE VISIT (OUTPATIENT)
Dept: ORTHOPEDIC SURGERY | Age: 62
End: 2022-07-26
Payer: MEDICAID

## 2022-07-26 VITALS — WEIGHT: 222 LBS | BODY MASS INDEX: 31.78 KG/M2 | HEIGHT: 70 IN

## 2022-07-26 DIAGNOSIS — M17.11 ARTHRITIS OF RIGHT KNEE: Primary | ICD-10-CM

## 2022-07-26 PROCEDURE — 99203 OFFICE O/P NEW LOW 30 MIN: CPT | Performed by: PHYSICIAN ASSISTANT

## 2022-07-26 PROCEDURE — 20610 DRAIN/INJ JOINT/BURSA W/O US: CPT | Performed by: PHYSICIAN ASSISTANT

## 2022-07-26 RX ORDER — TRIAMCINOLONE ACETONIDE 40 MG/ML
40 INJECTION, SUSPENSION INTRA-ARTICULAR; INTRAMUSCULAR ONCE
Status: COMPLETED | OUTPATIENT
Start: 2022-07-26 | End: 2022-07-26

## 2022-07-26 RX ADMIN — TRIAMCINOLONE ACETONIDE 40 MG: 40 INJECTION, SUSPENSION INTRA-ARTICULAR; INTRAMUSCULAR at 09:37

## 2022-07-26 NOTE — PROGRESS NOTES
Pavel Amin (: 1960) is a 63 y.o. male patient, here for evaluation of the following chief complaint(s):  Knee Pain (Right knee pain/)       ASSESSMENT/PLAN:  Below is the assessment and plan developed based on review of pertinent history, physical exam, labs, studies, and medications. 81-year-old male comes in today for evaluation of right knee pain. Is been going on for about 6 months. X-rays independently reviewed from outside facility show moderate medial joint space narrowing with small osteophyte formation present. Symptoms likely secondary to osteoarthritic flare. Discussed treatment options with patient. Unable to take NSAIDs given recent heart attack and on Brilinta. We will try a steroid injection. After verbal consent was obtained I aspirated 15 cc clear yellow synovial fluid I then injected  3mL Lidocaine and 40 mg triamcinolone into the right knee joint using sterile technique. Patient tolerated well. Encouraged at home physical therapy exercises. Plans to monitor symptoms and follow-up as needed. 1. Arthritis of right knee  -     DRAIN/INJECT LARGE JOINT/BURSA  -     triamcinolone acetonide (KENALOG-40) 40 mg/mL injection 40 mg; 40 mg, Intra artICUlar, ONCE, 1 dose, On 22 at 1000      Encounter Diagnosis   Name Primary? Arthritis of right knee Yes        No follow-ups on file. SUBJECTIVE/OBJECTIVE:  Pavel Amin (: 1960) is a 63 y.o. male who presents today for the following:  Chief Complaint   Patient presents with    Knee Pain     Right knee pain         81-year-old male comes in today for evaluation of right knee pain. Pain is medially based. Has been going on for the last 6+ months. Has intermittent swelling. Rates pain as severe sharp stabbing in nature. Unable to walk any sort of distance without pain. Has to use a cane or a walking stick for assistance.   Has tried medication over-the-counter, weight loss, braces and walking aids with no real relief in symptoms. IMAGING:  XR Results (most recent):  Results from Hospital Encounter encounter on 05/26/22    XR KNEE RT 3 V    Narrative  EXAM: XR KNEE RT 3 V    INDICATION: Chronic right knee pain. COMPARISON: None. FINDINGS: Three views of the right knee demonstrate no fracture or other acute  osseous or articular abnormality. There is moderate medial joint space narrowing  and osteophytosis. There is small joint effusion. Small amount of soft tissue swelling is noted medially. Impression  Moderate medial compartment right knee osteoarthritis       Allergies   Allergen Reactions    Hydrocodone Nausea Only    Naprosyn [Naproxen] Nausea and Vomiting    Pravastatin Other (comments)       Current Outpatient Medications   Medication Sig    diclofenac (VOLTAREN) 1 % gel APPLY 2 GRAMS TO AFFECTED AREA FOUR (4) TIMES DAILY. acetaminophen (TYLENOL) 500 mg tablet TAKE 1 TABLET BY MOUTH EVERY SIX HOURS AS NEEDED FOR PAIN.    amLODIPine (NORVASC) 10 mg tablet Take 1 Tablet by mouth daily. aspirin 81 mg chewable tablet Take 1 Tablet by mouth daily. lisinopriL (PRINIVIL, ZESTRIL) 10 mg tablet Take 1 Tablet by mouth daily. metoprolol tartrate (LOPRESSOR) 50 mg tablet Take 1 Tablet by mouth every twelve (12) hours. rosuvastatin (CRESTOR) 10 mg tablet Take 1 Tablet by mouth nightly. ticagrelor (BRILINTA) 90 mg tablet Take 1 Tablet by mouth every twelve (12) hours every twelve (12) hours. lidocaine (LIDODERM) 5 % 1 Patch by TransDERmal route every twelve (12) hours. Apply patch to the affected area for 12 hours a day and remove for 12 hours a day.     Blood-Glucose Meter (FREESTYLE FREEDOM LITE) monitoring kit Check blood sugar daily    glucose blood VI test strips (FREESTYLE LITE STRIPS) strip Check blood sugar daily     Current Facility-Administered Medications   Medication    triamcinolone acetonide (KENALOG-40) 40 mg/mL injection 40 mg       Past Medical History:   Diagnosis Date    Delusions of parasitosis ([facility] 2014    ED (erectile dysfunction)     Grandiose delusion [facility] 2014    Hemorrhoid 2014    Hypercholesterolemia     Hypertension     Noncompliance 7/10/2014    Paranoia querulans ([facility] 2014    Prediabetes 2014    Sarcoidosis 2014    Sarcoidosis, lung ([facility]     Sciatica of right side 2014    Xerosis of skin 2014        No past surgical history on file. Family History   Problem Relation Age of Onset    Heart Attack Mother     Diabetes Mother     Hypertension Mother     Heart Attack Father     Hypertension Father     Diabetes Daughter         Social History     Tobacco Use    Smoking status: Former     Types: Cigarettes     Quit date: 2010     Years since quittin.5    Smokeless tobacco: Never   Substance Use Topics    Alcohol use: Not Currently        All systems reviewed x 12 and were negative with the exception of None      No flowsheet data found. Vitals:  Ht 5' 10\" (1.778 m)   Wt 222 lb (100.7 kg)   BMI 31.85 kg/m²    Body mass index is 31.85 kg/m². Physical Exam    General: NAD, well developed, well nourished, alert and oriented x 3. Cardiac: Extremities well perfused    Respiratory: Nonlabored breathing    LLE: Normal gait and station. Negative stinchfield. No effusion noted. No previous incisions noted. ROM 0-120 degrees. Grossly stable to varus/valgus stress and anterior/posterior drawer tests. Negative McMurrays. Motor grossly intact. RLE: Mild antalgic gait. Mild effusion noted. No previous incisions noted. ROM 0-120 degrees. Grossly stable to varus/valgus stress and anterior/posterior drawer tests. Medial joint line tenderness. Motor grossly intact. Vascular: Palpable pedal pulses, equal bilaterally. Skin: Warm well perfused, cap refill < 2 sec. Kayli Mosley M.D. was available for immediate consultation as the supervising physician.         An electronic signature was used to authenticate this note.   -- Drew Schmitt PA-C

## 2022-08-04 DIAGNOSIS — M25.561 CHRONIC PAIN OF RIGHT KNEE: ICD-10-CM

## 2022-08-04 DIAGNOSIS — G89.29 CHRONIC PAIN OF RIGHT KNEE: ICD-10-CM

## 2022-08-04 DIAGNOSIS — I10 ESSENTIAL HYPERTENSION: ICD-10-CM

## 2022-08-04 RX ORDER — AMLODIPINE BESYLATE 10 MG/1
TABLET ORAL
Qty: 90 TABLET | Refills: 0 | Status: SHIPPED | OUTPATIENT
Start: 2022-08-04

## 2022-08-04 RX ORDER — ACETAMINOPHEN 500 MG
TABLET ORAL
Qty: 60 TABLET | Refills: 0 | Status: SHIPPED | OUTPATIENT
Start: 2022-08-04

## 2022-08-04 RX ORDER — LIDOCAINE 50 MG/G
PATCH TOPICAL
Qty: 30 PATCH | Refills: 1 | Status: SHIPPED | OUTPATIENT
Start: 2022-08-04 | End: 2022-10-21

## 2022-08-26 ENCOUNTER — OFFICE VISIT (OUTPATIENT)
Dept: ORTHOPEDIC SURGERY | Age: 62
End: 2022-08-26
Payer: MEDICAID

## 2022-08-26 VITALS — BODY MASS INDEX: 34.36 KG/M2 | WEIGHT: 240 LBS | HEIGHT: 70 IN

## 2022-08-26 DIAGNOSIS — Q66.6 PES PLANOVALGUS: ICD-10-CM

## 2022-08-26 DIAGNOSIS — M19.272 OTHER SECONDARY OSTEOARTHRITIS OF LEFT FOOT: Primary | ICD-10-CM

## 2022-08-26 DIAGNOSIS — M79.672 LEFT FOOT PAIN: ICD-10-CM

## 2022-08-26 PROCEDURE — 99202 OFFICE O/P NEW SF 15 MIN: CPT | Performed by: ORTHOPAEDIC SURGERY

## 2022-08-26 RX ORDER — METHYLPREDNISOLONE 4 MG/1
TABLET ORAL
Qty: 1 DOSE PACK | Refills: 0 | Status: SHIPPED | OUTPATIENT
Start: 2022-08-26 | End: 2022-09-07

## 2022-08-26 NOTE — LETTER
9/5/2022    Patient: Angelina Ny   YOB: 1960   Date of Visit: 8/26/2022     Armin Ladd. Jun Covarrubias NP  [address] [facility] [place]    Dear Armin Ladd. Jun Covarrubias NP,      Thank you for referring Mr. Oziel Lunsford to [facility] for evaluation. My notes for this consultation are attached. If you have questions, please do not hesitate to call me. I look forward to following your patient along with you.       Sincerely,    Josie Suazo MD

## 2022-08-26 NOTE — PROGRESS NOTES
Vilma Meyer (: 1960) is a 63 y.o. male, patient,here for evaluation of the following No chief complaint on file. ASSESSMENT/PLAN:  Below is the assessment and plan developed based on review of pertinent history, physical exam, labs, studies, and medications. 1. Other secondary osteoarthritis of left foot  -     REFERRAL TO PHYSICAL THERAPY  2. Left foot pain  -     XR STANDING FOOT LT MIN 3 V; Future  -     methylPREDNISolone (MEDROL DOSEPACK) 4 mg tablet; Take 1 Tablet by mouth Specific Days and Specific Times for 6 days, THEN 1 Tablet Specific Days and Specific Times for 6 days. As instructed on packet, Normal, Disp-1 Dose Pack, R-0  -     REFERRAL TO PHYSICAL THERAPY  3. Pes planovalgus  -     REFERRAL TO PHYSICAL THERAPY      Patient is informed of findings, a lot of the pain appears related to the underlying pes planovalgus foot position and some underlying arthritis associated with this. Did recommend physical therapy program for stretching and overall conditioning to help with this problem. Provided information about types of shoes and insoles to try and we will try a Medrol pack for anti-inflammatory treatment as there is some underlying swelling and he describes having had this as well. He does have recent history of MI, is not a great surgical candidate and I do not recommend surgery at this time. He is provided a temporary parking placard. He may be a candidate for 1 cortisone injection in the future if the pain is still around the area of sinus tarsi or hindfoot joints only, if other joints involved, interventional radiology guided injection may be best option. At this point, recommend the initial treatments provided today. Return if symptoms worsen or fail to improve.       Allergies   Allergen Reactions    Hydrocodone Nausea Only    Naprosyn [Naproxen] Nausea and Vomiting    Pravastatin Other (comments)       Current Outpatient Medications   Medication Sig methylPREDNISolone (MEDROL DOSEPACK) 4 mg tablet Take 1 Tablet by mouth Specific Days and Specific Times for 6 days, THEN 1 Tablet Specific Days and Specific Times for 6 days. As instructed on packet    acetaminophen (TYLENOL) 500 mg tablet TAKE 1 TABLET BY MOUTH EVERY 6 HOURS AS NEEDED FOR PAIN    lidocaine (LIDODERM) 5 % APPLY 1 PATCH TO THE AFFECTED AREA FOR 12 HOURS A DAY AND REMOVE FOR 12 HOURS A DAY. amLODIPine (NORVASC) 10 mg tablet TAKE 1 TABLET BY MOUTH EVERY DAY (STOP HYDROCHLOROTHIAZIDE)    diclofenac (VOLTAREN) 1 % gel APPLY 2 GRAMS TO AFFECTED AREA FOUR (4) TIMES DAILY. aspirin 81 mg chewable tablet Take 1 Tablet by mouth daily. lisinopriL (PRINIVIL, ZESTRIL) 10 mg tablet Take 1 Tablet by mouth daily. metoprolol tartrate (LOPRESSOR) 50 mg tablet Take 1 Tablet by mouth every twelve (12) hours. rosuvastatin (CRESTOR) 10 mg tablet Take 1 Tablet by mouth nightly. ticagrelor (BRILINTA) 90 mg tablet Take 1 Tablet by mouth every twelve (12) hours every twelve (12) hours. Blood-Glucose Meter (FREESTYLE FREEDOM LITE) monitoring kit Check blood sugar daily    glucose blood VI test strips (FREESTYLE LITE STRIPS) strip Check blood sugar daily     No current facility-administered medications for this visit. 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 No pertinent surgical history.     Family History   Problem Relation Age of Onset    Heart Attack Mother     Diabetes Mother     Hypertension Mother     Heart Attack Father     Hypertension Father     Diabetes Daughter        Social History     Socioeconomic History    Marital status:      Spouse name: Not on file    Number of children: Not on file    Years of education: Not on file    Highest education level: Not on file   Occupational History    Not on file   Tobacco Use    Smoking status: Former     Types: Cigarettes     Quit date: 2010     Years since quittin.6    Smokeless tobacco: Never   Vaping Use    Vaping Use: Never used   Substance and Sexual Activity    Alcohol use: Not Currently    Drug use: Yes     Types: Prescription, OTC    Sexual activity: Yes     Partners: Male   Other Topics Concern    Not on file   Social History Narrative    Not on file     Social Determinants of Health     Financial Resource Strain: Not on file   Food Insecurity: Not on file   Transportation Needs: Not on file   Physical Activity: Not on file   Stress: Not on file   Social Connections: Not on file   Intimate Partner Violence: Not on file   Housing Stability: Not on file           Vitals:  Ht 5' 10\" (1.778 m)   Wt 240 lb (108.9 kg)   BMI 34.44 kg/m²    Body mass index is 34.44 kg/m². SUBJECTIVE:  Ivette Dent (: 1960)   New patient presents today with complaint of left foot pain ongoing for several weeks now. States he had a heart attack on 2022 and he feels the symptoms started sometimes after the heart attack. Current pain is severe sharp pain that comes and goes interferes with sleep. Weakness is present, standing, stairs/steps, running walking make it worse. Patient has tried rest and Tylenol but getting worse. He has not seen other physicians or providers for this particular problem. He is not diabetic, he has heart disease and hypertension. Non-smoker. OBJECTIVE EXAM:     Visit Vitals  Ht 5' 10\" (1.778 m)   Wt 240 lb (108.9 kg)   BMI 34.44 kg/m²       Appearance: Alert, well appearing and pleasant patient who is in no distress, oriented to person, place/time, and who follows commands.  This patient is accompanied in the       office by his  self. Psychiatric: Affect and mood are appropriate. No dementia noted on examination  Musculoskeletal:  LOCATION: Fuhs tenderness to palpation around the midfoot joints and hindfoot joints with underlying pes planovalgus deformity, left foot. Integumentary: No rashes, skin patches, wounds, or abrasions to the right or left legs       Warm and normal color. No regions of expressible drainage. Gait: Normal      Tenderness: No tenderness        Motor/Strength/Tone Exam: Normal       Sensory Exam:   Intact Normal Sensation to ankle/foot      Stability Testing: No anterolateral or varus instability of the Ankle or Subtalar Joints               No peroneal tendon instability noted      ROM: Normal ROM noted to ankle/foot      Contractures: No Achilles or Gastrocnemius Contractures      Calf tenderness: Absent for calf or gastrocnemius muscle regions       Soft, supple, non tender, non taut lower extremity compartment  Alignment:      NEUTRAL Hindfoot,         none Metatarsus Adductus Metatarsus   Wounds/Abrasions:    None present  Extremities:   No embolic phenomena to the toes          No significant edema to the foot and or toes. Lower extremities are warm and appear well perfused    DVT: No evidence of DVT seen on examination at this time     No calf swelling, no tenderness to calf muscles  Lymphatic:  No Evidence of Lymphedema  Vascular: Medial Border of Tibia Region: Edema is not present         Pulses: Dorsalis Pedis &  Posterior Tibial Pulses : Palpable yes        Varicosities Lower Limbs :  None  noted  Neuro: Negative bilateral Straight leg raise (seated position)    See Musculoskeletal section for pertinent individual extremity examination    No abnormal hand/wrist, foot/ankle, or facial/neck tremors. Lower Extremity/Ankle/Foot:  Mild antalgic gait, pes planovalgus weightbearing stance. Left lower extremity/ankle:  There is satisfactory range of motion and strength although tightness is noted with attempted dorsiflexion when hindfoot corrected to neutral and knee extended, the Achilles tendon intact with negative Oates test, negative ankle squeeze test.    Left foot: Planovalgus foot position is noted, tenderness to palpation around the hindfoot midfoot joints including the sinus tarsi, there is mild along the hindfoot medial along posterior tibial tendon but most is the hindfoot and midfoot joints. Able to flex and extend toes satisfactory motion and strength, there is no significant swelling at this point compared to contralateral maybe a little at the midfoot area, the metatarsals nontender. Able to flex and extend toes satisfactory to motion strength 5/5. Contralateral lower extremity/ankle /foot exam:  Nontender, no swelling ligaments grossly stable. Normal weightbearing stance. Neurovascular exam intact for light touch sensation, dorsalis pedis pulse palpable, capillary refill is present, flexion/extension toe strength 5/5. Imaging:    XR Results (most recent):  Results from Appointment encounter on 08/26/22    XR STANDING FOOT LT MIN 3 V    Narrative  Left foot AP, lateral and oblique x-rays show pes planovalgus foot, no acute fractures or dislocations, left foot is more to contralateral.            An electronic signature was used to authenticate this note.   -- Nate Blanco MD

## 2022-10-21 DIAGNOSIS — G89.29 CHRONIC PAIN OF RIGHT KNEE: ICD-10-CM

## 2022-10-21 DIAGNOSIS — M25.561 CHRONIC PAIN OF RIGHT KNEE: ICD-10-CM

## 2022-10-21 RX ORDER — LIDOCAINE 50 MG/G
PATCH TOPICAL
Qty: 30 PATCH | Refills: 1 | Status: SHIPPED | OUTPATIENT
Start: 2022-10-21

## 2022-12-06 DIAGNOSIS — M25.561 CHRONIC PAIN OF RIGHT KNEE: ICD-10-CM

## 2022-12-06 DIAGNOSIS — I10 ESSENTIAL HYPERTENSION: ICD-10-CM

## 2022-12-06 DIAGNOSIS — G89.29 CHRONIC PAIN OF RIGHT KNEE: ICD-10-CM

## 2022-12-06 RX ORDER — DICLOFENAC SODIUM 10 MG/G
GEL TOPICAL
Qty: 100 G | Refills: 1 | Status: SHIPPED | OUTPATIENT
Start: 2022-12-06

## 2022-12-06 RX ORDER — AMLODIPINE BESYLATE 10 MG/1
TABLET ORAL
Qty: 90 TABLET | Refills: 0 | Status: SHIPPED | OUTPATIENT
Start: 2022-12-06

## 2022-12-06 RX ORDER — ACETAMINOPHEN 500 MG
TABLET ORAL
Qty: 60 TABLET | Refills: 0 | Status: SHIPPED | OUTPATIENT
Start: 2022-12-06

## 2022-12-06 RX ORDER — TICAGRELOR 90 MG/1
TABLET ORAL
Qty: 180 TABLET | Refills: 0 | Status: SHIPPED | OUTPATIENT
Start: 2022-12-06

## 2023-02-25 DIAGNOSIS — G89.29 CHRONIC PAIN OF RIGHT KNEE: ICD-10-CM

## 2023-02-25 DIAGNOSIS — M25.561 CHRONIC PAIN OF RIGHT KNEE: ICD-10-CM

## 2023-02-27 RX ORDER — DICLOFENAC SODIUM 10 MG/G
GEL TOPICAL
Qty: 100 G | Refills: 1 | Status: SHIPPED | OUTPATIENT
Start: 2023-02-27

## 2023-04-02 DIAGNOSIS — I10 ESSENTIAL HYPERTENSION: ICD-10-CM

## 2023-04-03 RX ORDER — TICAGRELOR 90 MG/1
TABLET ORAL
Qty: 180 TABLET | Refills: 0 | Status: SHIPPED | OUTPATIENT
Start: 2023-04-03

## 2023-04-03 RX ORDER — AMLODIPINE BESYLATE 10 MG/1
TABLET ORAL
Qty: 90 TABLET | Refills: 0 | Status: SHIPPED | OUTPATIENT
Start: 2023-04-03

## 2023-04-03 RX ORDER — ROSUVASTATIN CALCIUM 10 MG/1
10 TABLET, COATED ORAL
Qty: 90 TABLET | Refills: 1 | Status: SHIPPED | OUTPATIENT
Start: 2023-04-03

## 2023-04-28 DIAGNOSIS — G89.29 CHRONIC PAIN OF RIGHT KNEE: ICD-10-CM

## 2023-04-28 DIAGNOSIS — M25.561 CHRONIC PAIN OF RIGHT KNEE: ICD-10-CM

## 2023-04-30 RX ORDER — LIDOCAINE 50 MG/G
PATCH TOPICAL
Qty: 30 PATCH | Refills: 1 | Status: SHIPPED | OUTPATIENT
Start: 2023-04-30

## 2023-05-01 DIAGNOSIS — G89.29 CHRONIC PAIN OF RIGHT KNEE: ICD-10-CM

## 2023-05-01 DIAGNOSIS — M25.561 CHRONIC PAIN OF RIGHT KNEE: ICD-10-CM

## 2023-05-01 RX ORDER — DICLOFENAC SODIUM 10 MG/G
GEL TOPICAL
Qty: 100 G | Refills: 1 | Status: SHIPPED | OUTPATIENT
Start: 2023-05-01

## 2023-05-25 RX ORDER — ACETAMINOPHEN 500 MG
1 TABLET ORAL EVERY 6 HOURS PRN
COMMUNITY
Start: 2022-12-06 | End: 2023-06-15

## 2023-05-25 RX ORDER — ASPIRIN 81 MG/1
81 TABLET, CHEWABLE ORAL DAILY
COMMUNITY
Start: 2022-05-26 | End: 2023-06-15

## 2023-05-25 RX ORDER — ROSUVASTATIN CALCIUM 10 MG/1
1 TABLET, COATED ORAL NIGHTLY
COMMUNITY
Start: 2023-04-03

## 2023-05-25 RX ORDER — LIDOCAINE 50 MG/G
PATCH TOPICAL
COMMUNITY
Start: 2023-04-30

## 2023-05-25 RX ORDER — AMLODIPINE BESYLATE 10 MG/1
TABLET ORAL
COMMUNITY
Start: 2023-04-03

## 2023-05-25 RX ORDER — METOPROLOL TARTRATE 50 MG/1
50 TABLET, FILM COATED ORAL EVERY 12 HOURS
COMMUNITY
Start: 2022-05-26

## 2023-05-25 RX ORDER — LISINOPRIL 10 MG/1
10 TABLET ORAL DAILY
COMMUNITY
Start: 2022-05-26 | End: 2023-06-15

## 2023-07-02 ENCOUNTER — HOSPITAL ENCOUNTER (INPATIENT)
Facility: HOSPITAL | Age: 63
LOS: 2 days | Discharge: HOME | DRG: 199 | End: 2023-07-04
Attending: STUDENT IN AN ORGANIZED HEALTH CARE EDUCATION/TRAINING PROGRAM | Admitting: INTERNAL MEDICINE
Payer: MEDICAID

## 2023-07-02 ENCOUNTER — APPOINTMENT (OUTPATIENT)
Facility: HOSPITAL | Age: 63
DRG: 199 | End: 2023-07-02
Payer: MEDICAID

## 2023-07-02 DIAGNOSIS — I16.0 HYPERTENSIVE URGENCY: ICD-10-CM

## 2023-07-02 DIAGNOSIS — R77.8 ELEVATED TROPONIN: ICD-10-CM

## 2023-07-02 DIAGNOSIS — I16.0 HYPERTENSIVE URGENCY, MALIGNANT: Primary | ICD-10-CM

## 2023-07-02 LAB
ALBUMIN SERPL-MCNC: 4.8 G/DL (ref 3.5–5)
ALBUMIN/GLOB SERPL: 1.1 (ref 1.1–2.2)
ALP SERPL-CCNC: 70 U/L (ref 45–117)
ALT SERPL-CCNC: 26 U/L (ref 12–78)
ANION GAP SERPL CALC-SCNC: 10 MMOL/L (ref 5–15)
AST SERPL-CCNC: 15 U/L (ref 15–37)
BASOPHILS # BLD: 0 K/UL (ref 0–0.1)
BASOPHILS NFR BLD: 0 % (ref 0–1)
BILIRUB SERPL-MCNC: 0.7 MG/DL (ref 0.2–1)
BUN SERPL-MCNC: 17 MG/DL (ref 6–20)
BUN/CREAT SERPL: 13 (ref 12–20)
CALCIUM SERPL-MCNC: 10.1 MG/DL (ref 8.5–10.1)
CHLORIDE SERPL-SCNC: 103 MMOL/L (ref 97–108)
CO2 SERPL-SCNC: 23 MMOL/L (ref 21–32)
CREAT SERPL-MCNC: 1.33 MG/DL (ref 0.7–1.3)
DIFFERENTIAL METHOD BLD: ABNORMAL
EKG ATRIAL RATE: 88 BPM
EKG DIAGNOSIS: NORMAL
EKG P AXIS: 40 DEGREES
EKG P-R INTERVAL: 190 MS
EKG Q-T INTERVAL: 412 MS
EKG QRS DURATION: 102 MS
EKG QTC CALCULATION (BAZETT): 498 MS
EKG R AXIS: -37 DEGREES
EKG T AXIS: 12 DEGREES
EKG VENTRICULAR RATE: 88 BPM
EOSINOPHIL # BLD: 0 K/UL (ref 0–0.4)
EOSINOPHIL NFR BLD: 0 % (ref 0–7)
ERYTHROCYTE [DISTWIDTH] IN BLOOD BY AUTOMATED COUNT: 14.1 % (ref 11.5–14.5)
GLOBULIN SER CALC-MCNC: 4.5 G/DL (ref 2–4)
GLUCOSE SERPL-MCNC: 174 MG/DL (ref 65–100)
HCT VFR BLD AUTO: 43.9 % (ref 36.6–50.3)
HGB BLD-MCNC: 15.4 G/DL (ref 12.1–17)
IMM GRANULOCYTES # BLD AUTO: 0.1 K/UL (ref 0–0.04)
IMM GRANULOCYTES NFR BLD AUTO: 1 % (ref 0–0.5)
LIPASE SERPL-CCNC: 119 U/L (ref 73–393)
LYMPHOCYTES # BLD: 1.7 K/UL (ref 0.8–3.5)
LYMPHOCYTES NFR BLD: 11 % (ref 12–49)
MCH RBC QN AUTO: 30.6 PG (ref 26–34)
MCHC RBC AUTO-ENTMCNC: 35.1 G/DL (ref 30–36.5)
MCV RBC AUTO: 87.3 FL (ref 80–99)
MONOCYTES # BLD: 0.6 K/UL (ref 0–1)
MONOCYTES NFR BLD: 4 % (ref 5–13)
NEUTS SEG # BLD: 12.6 K/UL (ref 1.8–8)
NEUTS SEG NFR BLD: 84 % (ref 32–75)
NRBC # BLD: 0 K/UL (ref 0–0.01)
NRBC BLD-RTO: 0 PER 100 WBC
PLATELET # BLD AUTO: 298 K/UL (ref 150–400)
PMV BLD AUTO: 9.7 FL (ref 8.9–12.9)
POTASSIUM SERPL-SCNC: 3.1 MMOL/L (ref 3.5–5.1)
PROT SERPL-MCNC: 9.3 G/DL (ref 6.4–8.2)
RBC # BLD AUTO: 5.03 M/UL (ref 4.1–5.7)
SODIUM SERPL-SCNC: 136 MMOL/L (ref 136–145)
TROPONIN I SERPL HS-MCNC: 108 NG/L (ref 0–76)
TROPONIN I SERPL HS-MCNC: 225 NG/L (ref 0–76)
TROPONIN I SERPL HS-MCNC: 61 NG/L (ref 0–76)
WBC # BLD AUTO: 15 K/UL (ref 4.1–11.1)

## 2023-07-02 PROCEDURE — 96375 TX/PRO/DX INJ NEW DRUG ADDON: CPT

## 2023-07-02 PROCEDURE — 71275 CT ANGIOGRAPHY CHEST: CPT

## 2023-07-02 PROCEDURE — 6360000002 HC RX W HCPCS: Performed by: INTERNAL MEDICINE

## 2023-07-02 PROCEDURE — 99285 EMERGENCY DEPT VISIT HI MDM: CPT

## 2023-07-02 PROCEDURE — 2500000003 HC RX 250 WO HCPCS: Performed by: INTERNAL MEDICINE

## 2023-07-02 PROCEDURE — 71045 X-RAY EXAM CHEST 1 VIEW: CPT

## 2023-07-02 PROCEDURE — 85025 COMPLETE CBC W/AUTO DIFF WBC: CPT

## 2023-07-02 PROCEDURE — 80053 COMPREHEN METABOLIC PANEL: CPT

## 2023-07-02 PROCEDURE — 2580000003 HC RX 258: Performed by: STUDENT IN AN ORGANIZED HEALTH CARE EDUCATION/TRAINING PROGRAM

## 2023-07-02 PROCEDURE — 6360000002 HC RX W HCPCS: Performed by: STUDENT IN AN ORGANIZED HEALTH CARE EDUCATION/TRAINING PROGRAM

## 2023-07-02 PROCEDURE — 93005 ELECTROCARDIOGRAM TRACING: CPT | Performed by: STUDENT IN AN ORGANIZED HEALTH CARE EDUCATION/TRAINING PROGRAM

## 2023-07-02 PROCEDURE — 36415 COLL VENOUS BLD VENIPUNCTURE: CPT

## 2023-07-02 PROCEDURE — 84484 ASSAY OF TROPONIN QUANT: CPT

## 2023-07-02 PROCEDURE — 6370000000 HC RX 637 (ALT 250 FOR IP): Performed by: INTERNAL MEDICINE

## 2023-07-02 PROCEDURE — 6360000004 HC RX CONTRAST MEDICATION: Performed by: STUDENT IN AN ORGANIZED HEALTH CARE EDUCATION/TRAINING PROGRAM

## 2023-07-02 PROCEDURE — 96365 THER/PROPH/DIAG IV INF INIT: CPT

## 2023-07-02 PROCEDURE — 2580000003 HC RX 258: Performed by: INTERNAL MEDICINE

## 2023-07-02 PROCEDURE — 83690 ASSAY OF LIPASE: CPT

## 2023-07-02 PROCEDURE — 1100000000 HC RM PRIVATE

## 2023-07-02 PROCEDURE — 2500000003 HC RX 250 WO HCPCS: Performed by: STUDENT IN AN ORGANIZED HEALTH CARE EDUCATION/TRAINING PROGRAM

## 2023-07-02 PROCEDURE — 70450 CT HEAD/BRAIN W/O DYE: CPT

## 2023-07-02 RX ORDER — ASPIRIN 81 MG/1
81 TABLET, CHEWABLE ORAL DAILY
Status: DISCONTINUED | OUTPATIENT
Start: 2023-07-02 | End: 2023-07-04 | Stop reason: HOSPADM

## 2023-07-02 RX ORDER — POTASSIUM CHLORIDE 20 MEQ/1
20 TABLET, EXTENDED RELEASE ORAL 2 TIMES DAILY WITH MEALS
Status: DISCONTINUED | OUTPATIENT
Start: 2023-07-02 | End: 2023-07-04 | Stop reason: HOSPADM

## 2023-07-02 RX ORDER — METOPROLOL TARTRATE 50 MG/1
50 TABLET, FILM COATED ORAL EVERY 12 HOURS
Status: DISCONTINUED | OUTPATIENT
Start: 2023-07-02 | End: 2023-07-02

## 2023-07-02 RX ORDER — DEXTROSE, SODIUM CHLORIDE, AND POTASSIUM CHLORIDE 5; .9; .15 G/100ML; G/100ML; G/100ML
INJECTION INTRAVENOUS CONTINUOUS
Status: DISCONTINUED | OUTPATIENT
Start: 2023-07-02 | End: 2023-07-03

## 2023-07-02 RX ORDER — LISINOPRIL 10 MG/1
10 TABLET ORAL DAILY
Status: DISCONTINUED | OUTPATIENT
Start: 2023-07-02 | End: 2023-07-04 | Stop reason: HOSPADM

## 2023-07-02 RX ORDER — METOPROLOL TARTRATE 50 MG/1
50 TABLET, FILM COATED ORAL EVERY 12 HOURS
Status: DISCONTINUED | OUTPATIENT
Start: 2023-07-02 | End: 2023-07-04 | Stop reason: HOSPADM

## 2023-07-02 RX ORDER — ONDANSETRON 4 MG/1
4 TABLET, ORALLY DISINTEGRATING ORAL EVERY 8 HOURS PRN
Status: DISCONTINUED | OUTPATIENT
Start: 2023-07-02 | End: 2023-07-04 | Stop reason: HOSPADM

## 2023-07-02 RX ORDER — ONDANSETRON 2 MG/ML
4 INJECTION INTRAMUSCULAR; INTRAVENOUS EVERY 6 HOURS PRN
Status: DISCONTINUED | OUTPATIENT
Start: 2023-07-02 | End: 2023-07-04 | Stop reason: HOSPADM

## 2023-07-02 RX ORDER — SODIUM CHLORIDE 9 MG/ML
INJECTION, SOLUTION INTRAVENOUS PRN
Status: DISCONTINUED | OUTPATIENT
Start: 2023-07-02 | End: 2023-07-04 | Stop reason: HOSPADM

## 2023-07-02 RX ORDER — LABETALOL HYDROCHLORIDE 5 MG/ML
10 INJECTION, SOLUTION INTRAVENOUS
Status: COMPLETED | OUTPATIENT
Start: 2023-07-02 | End: 2023-07-02

## 2023-07-02 RX ORDER — ACETAMINOPHEN 325 MG/1
650 TABLET ORAL EVERY 6 HOURS PRN
Status: DISCONTINUED | OUTPATIENT
Start: 2023-07-02 | End: 2023-07-04 | Stop reason: HOSPADM

## 2023-07-02 RX ORDER — LISINOPRIL 5 MG/1
10 TABLET ORAL DAILY
Status: DISCONTINUED | OUTPATIENT
Start: 2023-07-02 | End: 2023-07-02

## 2023-07-02 RX ORDER — ACETAMINOPHEN 650 MG/1
650 SUPPOSITORY RECTAL EVERY 6 HOURS PRN
Status: DISCONTINUED | OUTPATIENT
Start: 2023-07-02 | End: 2023-07-04 | Stop reason: HOSPADM

## 2023-07-02 RX ORDER — SODIUM CHLORIDE 0.9 % (FLUSH) 0.9 %
5-40 SYRINGE (ML) INJECTION PRN
Status: DISCONTINUED | OUTPATIENT
Start: 2023-07-02 | End: 2023-07-04 | Stop reason: HOSPADM

## 2023-07-02 RX ORDER — SODIUM CHLORIDE 0.9 % (FLUSH) 0.9 %
5-40 SYRINGE (ML) INJECTION EVERY 12 HOURS SCHEDULED
Status: DISCONTINUED | OUTPATIENT
Start: 2023-07-02 | End: 2023-07-04 | Stop reason: HOSPADM

## 2023-07-02 RX ORDER — METRONIDAZOLE 500 MG/100ML
500 INJECTION, SOLUTION INTRAVENOUS EVERY 8 HOURS
Status: DISCONTINUED | OUTPATIENT
Start: 2023-07-02 | End: 2023-07-03

## 2023-07-02 RX ORDER — ROSUVASTATIN CALCIUM 10 MG/1
10 TABLET, COATED ORAL NIGHTLY
Status: DISCONTINUED | OUTPATIENT
Start: 2023-07-02 | End: 2023-07-04 | Stop reason: HOSPADM

## 2023-07-02 RX ORDER — POLYETHYLENE GLYCOL 3350 17 G/17G
17 POWDER, FOR SOLUTION ORAL DAILY PRN
Status: DISCONTINUED | OUTPATIENT
Start: 2023-07-02 | End: 2023-07-04 | Stop reason: HOSPADM

## 2023-07-02 RX ORDER — ENOXAPARIN SODIUM 100 MG/ML
30 INJECTION SUBCUTANEOUS 2 TIMES DAILY
Status: DISCONTINUED | OUTPATIENT
Start: 2023-07-02 | End: 2023-07-03

## 2023-07-02 RX ORDER — CIPROFLOXACIN 2 MG/ML
400 INJECTION, SOLUTION INTRAVENOUS EVERY 12 HOURS
Status: DISCONTINUED | OUTPATIENT
Start: 2023-07-02 | End: 2023-07-03

## 2023-07-02 RX ADMIN — POTASSIUM CHLORIDE, DEXTROSE MONOHYDRATE AND SODIUM CHLORIDE: 150; 5; 900 INJECTION, SOLUTION INTRAVENOUS at 21:31

## 2023-07-02 RX ADMIN — LISINOPRIL 10 MG: 10 TABLET ORAL at 20:20

## 2023-07-02 RX ADMIN — METRONIDAZOLE 500 MG: 500 INJECTION, SOLUTION INTRAVENOUS at 20:22

## 2023-07-02 RX ADMIN — ROSUVASTATIN CALCIUM 10 MG: 10 TABLET, FILM COATED ORAL at 22:20

## 2023-07-02 RX ADMIN — ASPIRIN 81 MG: 81 TABLET, CHEWABLE ORAL at 20:21

## 2023-07-02 RX ADMIN — SODIUM CHLORIDE 5 MG/HR: 9 INJECTION, SOLUTION INTRAVENOUS at 19:24

## 2023-07-02 RX ADMIN — CIPROFLOXACIN 400 MG: 2 INJECTION, SOLUTION INTRAVENOUS at 21:04

## 2023-07-02 RX ADMIN — LABETALOL HYDROCHLORIDE 10 MG: 5 INJECTION INTRAVENOUS at 16:57

## 2023-07-02 RX ADMIN — TICAGRELOR 90 MG: 90 TABLET ORAL at 20:21

## 2023-07-02 RX ADMIN — METOPROLOL TARTRATE 50 MG: 50 TABLET ORAL at 20:22

## 2023-07-02 RX ADMIN — POTASSIUM CHLORIDE 20 MEQ: 1500 TABLET, EXTENDED RELEASE ORAL at 20:21

## 2023-07-02 RX ADMIN — IOPAMIDOL 100 ML: 755 INJECTION, SOLUTION INTRAVENOUS at 17:28

## 2023-07-02 RX ADMIN — SODIUM CHLORIDE, PRESERVATIVE FREE 10 ML: 5 INJECTION INTRAVENOUS at 21:31

## 2023-07-02 ASSESSMENT — PAIN SCALES - GENERAL: PAINLEVEL_OUTOF10: 3

## 2023-07-03 ENCOUNTER — APPOINTMENT (OUTPATIENT)
Facility: HOSPITAL | Age: 63
DRG: 199 | End: 2023-07-03
Attending: INTERNAL MEDICINE
Payer: MEDICAID

## 2023-07-03 LAB
ANION GAP SERPL CALC-SCNC: 9 MMOL/L (ref 5–15)
BUN SERPL-MCNC: 15 MG/DL (ref 6–20)
BUN/CREAT SERPL: 11 (ref 12–20)
CALCIUM SERPL-MCNC: 9.1 MG/DL (ref 8.5–10.1)
CHLORIDE SERPL-SCNC: 106 MMOL/L (ref 97–108)
CO2 SERPL-SCNC: 22 MMOL/L (ref 21–32)
CREAT SERPL-MCNC: 1.39 MG/DL (ref 0.7–1.3)
ECHO AV MEAN GRADIENT: 4 MMHG
ECHO AV MEAN VELOCITY: 0.9 M/S
ECHO AV PEAK GRADIENT: 7 MMHG
ECHO AV PEAK VELOCITY: 1.3 M/S
ECHO AV VTI: 23.7 CM
ECHO BSA: 2.13 M2
ECHO LA DIAMETER INDEX: 1.71 CM/M2
ECHO LA DIAMETER: 3.6 CM
ECHO LV EDV A4C: 143 ML
ECHO LV EDV INDEX A4C: 68 ML/M2
ECHO LV EJECTION FRACTION A4C: 44 %
ECHO LV ESV A4C: 81 ML
ECHO LV ESV INDEX A4C: 38 ML/M2
ECHO LV FRACTIONAL SHORTENING: 30 % (ref 28–44)
ECHO LV INTERNAL DIMENSION DIASTOLE INDEX: 2.23 CM/M2
ECHO LV INTERNAL DIMENSION DIASTOLIC: 4.7 CM (ref 4.2–5.9)
ECHO LV INTERNAL DIMENSION SYSTOLIC INDEX: 1.56 CM/M2
ECHO LV INTERNAL DIMENSION SYSTOLIC: 3.3 CM
ECHO LV IVSD: 1.2 CM (ref 0.6–1)
ECHO LV MASS 2D: 224.8 G (ref 88–224)
ECHO LV MASS INDEX 2D: 106.5 G/M2 (ref 49–115)
ECHO LV POSTERIOR WALL DIASTOLIC: 1.3 CM (ref 0.6–1)
ECHO LV RELATIVE WALL THICKNESS RATIO: 0.55
ECHO LVOT AREA: 3.5 CM2
ECHO LVOT DIAM: 2.1 CM
ECHO MV MAX VELOCITY: 0.8 M/S
ECHO MV MEAN GRADIENT: 1 MMHG
ECHO MV MEAN VELOCITY: 0.5 M/S
ECHO MV PEAK GRADIENT: 2 MMHG
ECHO MV VTI: 28.6 CM
ECHO PULMONARY ARTERY END DIASTOLIC PRESSURE: 6 MMHG
ECHO PV REGURGITANT MAX VELOCITY: 1.3 M/S
GLUCOSE BLD STRIP.AUTO-MCNC: 135 MG/DL (ref 65–117)
GLUCOSE SERPL-MCNC: 149 MG/DL (ref 65–100)
MAGNESIUM SERPL-MCNC: 2.4 MG/DL (ref 1.6–2.4)
POTASSIUM SERPL-SCNC: 2.8 MMOL/L (ref 3.5–5.1)
SERVICE CMNT-IMP: ABNORMAL
SODIUM SERPL-SCNC: 137 MMOL/L (ref 136–145)
TROPONIN I SERPL HS-MCNC: 209 NG/L (ref 0–76)
TROPONIN I SERPL HS-MCNC: 259 NG/L (ref 0–76)

## 2023-07-03 PROCEDURE — 93308 TTE F-UP OR LMTD: CPT

## 2023-07-03 PROCEDURE — 6370000000 HC RX 637 (ALT 250 FOR IP): Performed by: INTERNAL MEDICINE

## 2023-07-03 PROCEDURE — 83735 ASSAY OF MAGNESIUM: CPT

## 2023-07-03 PROCEDURE — 6360000002 HC RX W HCPCS: Performed by: INTERNAL MEDICINE

## 2023-07-03 PROCEDURE — 80048 BASIC METABOLIC PNL TOTAL CA: CPT

## 2023-07-03 PROCEDURE — 84484 ASSAY OF TROPONIN QUANT: CPT

## 2023-07-03 PROCEDURE — 2580000003 HC RX 258: Performed by: INTERNAL MEDICINE

## 2023-07-03 PROCEDURE — 82962 GLUCOSE BLOOD TEST: CPT

## 2023-07-03 PROCEDURE — 36415 COLL VENOUS BLD VENIPUNCTURE: CPT

## 2023-07-03 PROCEDURE — 2060000000 HC ICU INTERMEDIATE R&B

## 2023-07-03 RX ORDER — SODIUM CHLORIDE AND POTASSIUM CHLORIDE 300; 900 MG/100ML; MG/100ML
INJECTION, SOLUTION INTRAVENOUS CONTINUOUS
Status: DISCONTINUED | OUTPATIENT
Start: 2023-07-03 | End: 2023-07-04 | Stop reason: HOSPADM

## 2023-07-03 RX ORDER — POTASSIUM CHLORIDE 750 MG/1
40 TABLET, FILM COATED, EXTENDED RELEASE ORAL EVERY 6 HOURS
Status: DISCONTINUED | OUTPATIENT
Start: 2023-07-03 | End: 2023-07-03

## 2023-07-03 RX ORDER — POTASSIUM CHLORIDE 750 MG/1
40 TABLET, FILM COATED, EXTENDED RELEASE ORAL EVERY 6 HOURS
Status: COMPLETED | OUTPATIENT
Start: 2023-07-03 | End: 2023-07-03

## 2023-07-03 RX ORDER — HYDRALAZINE HYDROCHLORIDE 20 MG/ML
20 INJECTION INTRAMUSCULAR; INTRAVENOUS EVERY 6 HOURS PRN
Status: DISCONTINUED | OUTPATIENT
Start: 2023-07-03 | End: 2023-07-04 | Stop reason: HOSPADM

## 2023-07-03 RX ORDER — ENOXAPARIN SODIUM 100 MG/ML
40 INJECTION SUBCUTANEOUS DAILY
Status: DISCONTINUED | OUTPATIENT
Start: 2023-07-03 | End: 2023-07-04 | Stop reason: HOSPADM

## 2023-07-03 RX ORDER — AMLODIPINE BESYLATE 5 MG/1
5 TABLET ORAL DAILY
Status: DISCONTINUED | OUTPATIENT
Start: 2023-07-03 | End: 2023-07-04 | Stop reason: HOSPADM

## 2023-07-03 RX ADMIN — SODIUM CHLORIDE, PRESERVATIVE FREE 10 ML: 5 INJECTION INTRAVENOUS at 08:04

## 2023-07-03 RX ADMIN — TICAGRELOR 90 MG: 90 TABLET ORAL at 08:03

## 2023-07-03 RX ADMIN — POTASSIUM CHLORIDE 40 MEQ: 750 TABLET, FILM COATED, EXTENDED RELEASE ORAL at 15:06

## 2023-07-03 RX ADMIN — PIPERACILLIN AND TAZOBACTAM 3375 MG: 3; .375 INJECTION, POWDER, LYOPHILIZED, FOR SOLUTION INTRAVENOUS at 16:41

## 2023-07-03 RX ADMIN — PIPERACILLIN AND TAZOBACTAM 3375 MG: 3; .375 INJECTION, POWDER, LYOPHILIZED, FOR SOLUTION INTRAVENOUS at 22:59

## 2023-07-03 RX ADMIN — POTASSIUM CHLORIDE 20 MEQ: 1500 TABLET, EXTENDED RELEASE ORAL at 08:03

## 2023-07-03 RX ADMIN — POTASSIUM CHLORIDE AND SODIUM CHLORIDE: 900; 300 INJECTION, SOLUTION INTRAVENOUS at 15:05

## 2023-07-03 RX ADMIN — POLYETHYLENE GLYCOL 3350 17 G: 17 POWDER, FOR SOLUTION ORAL at 08:03

## 2023-07-03 RX ADMIN — CIPROFLOXACIN 400 MG: 2 INJECTION, SOLUTION INTRAVENOUS at 09:20

## 2023-07-03 RX ADMIN — TICAGRELOR 90 MG: 90 TABLET ORAL at 18:51

## 2023-07-03 RX ADMIN — ROSUVASTATIN CALCIUM 10 MG: 10 TABLET, FILM COATED ORAL at 22:58

## 2023-07-03 RX ADMIN — SODIUM CHLORIDE, PRESERVATIVE FREE 10 ML: 5 INJECTION INTRAVENOUS at 22:59

## 2023-07-03 RX ADMIN — METRONIDAZOLE 500 MG: 500 INJECTION, SOLUTION INTRAVENOUS at 04:21

## 2023-07-03 RX ADMIN — METOPROLOL TARTRATE 50 MG: 50 TABLET ORAL at 18:51

## 2023-07-03 RX ADMIN — METRONIDAZOLE 500 MG: 500 INJECTION, SOLUTION INTRAVENOUS at 11:45

## 2023-07-03 RX ADMIN — ONDANSETRON 4 MG: 4 TABLET, ORALLY DISINTEGRATING ORAL at 15:02

## 2023-07-03 RX ADMIN — ASPIRIN 81 MG: 81 TABLET, CHEWABLE ORAL at 08:03

## 2023-07-03 RX ADMIN — AMLODIPINE BESYLATE 5 MG: 5 TABLET ORAL at 15:02

## 2023-07-03 RX ADMIN — LISINOPRIL 10 MG: 10 TABLET ORAL at 08:03

## 2023-07-03 RX ADMIN — POTASSIUM CHLORIDE 40 MEQ: 750 TABLET, FILM COATED, EXTENDED RELEASE ORAL at 22:58

## 2023-07-03 RX ADMIN — METOPROLOL TARTRATE 50 MG: 50 TABLET ORAL at 08:03

## 2023-07-03 ASSESSMENT — PAIN SCALES - GENERAL: PAINLEVEL_OUTOF10: 0

## 2023-07-03 NOTE — PROGRESS NOTES
0700: End of Shift Note    Bedside shift change report given to Bella Corado RN (oncoming nurse) by Kimo Buchanan RN (offgoing nurse).   Report included the following information SBAR, Intake/Output, MAR, and Cardiac Rhythm NSR    Shift worked:  0581-4676     Shift summary and any significant changes:    0530: Pt arrived on unit from ED    0600: Admission Database completed    0654: Troponin resulted at 209     Concerns for physician to address: -     Zone phone for oncoming shift:         Kimo Buchanan RN

## 2023-07-03 NOTE — PROGRESS NOTES
1445 -  Patient appeared to lose consciousness but eyes remained open. He was not responding to noxious stimuli. Legs moving in an unnatural way. Gopal Camilo came to bedside. , EKG showed prolonged Qtc. Zofran, amlodipine, and potassium 40meq given. End of Shift Note    Bedside shift change report given to nightshift, RN (oncoming nurse) by Na Martinez RN (offgoing nurse). Report included the following information SBAR, Kardex, Intake/Output, MAR, and Recent Results    Shift worked:  day     Shift summary and any significant changes:     See above     Concerns for physician to address:       Zone phone for oncoming shift:          Activity:     Number times ambulated in hallways past shift: 0  Number of times OOB to chair past shift: 0    Cardiac:   Cardiac Monitoring: Yes           Access:  Current line(s): PIV     Genitourinary:   Urinary status: voiding    Respiratory:      Chronic home O2 use?: NO  Incentive spirometer at bedside: YES       GI:     Current diet:  No diet orders on file  Passing flatus: YES  Tolerating current diet: YES       Pain Management:   Patient states pain is manageable on current regimen: YES    Skin:     Interventions: specialty bed, float heels, increase time out of bed, foam dressing, and PT/OT consult    Patient Safety:  Fall Score:    Interventions: bed/chair alarm, assistive device (walker, cane.  etc), gripper socks, pt to call before getting OOB, and stay with me (per policy)       Length of Stay:  Expected LOS: 3  Actual LOS: 1      Na Martinez RN

## 2023-07-03 NOTE — PLAN OF CARE
Problem: Discharge Planning  Goal: Discharge to home or other facility with appropriate resources  Outcome: Progressing  Flowsheets  Taken 7/3/2023 0608  Discharge to home or other facility with appropriate resources:   Identify barriers to discharge with patient and caregiver   Arrange for needed discharge resources and transportation as appropriate   Identify discharge learning needs (meds, wound care, etc)   Arrange for interpreters to assist at discharge as needed   Refer to discharge planning if patient needs post-hospital services based on physician order or complex needs related to functional status, cognitive ability or social support system  Taken 7/3/2023 0534  Discharge to home or other facility with appropriate resources:   Identify barriers to discharge with patient and caregiver   Arrange for needed discharge resources and transportation as appropriate   Identify discharge learning needs (meds, wound care, etc)   Arrange for interpreters to assist at discharge as needed   Refer to discharge planning if patient needs post-hospital services based on physician order or complex needs related to functional status, cognitive ability or social support system     Problem: Chronic Conditions and Co-morbidities  Goal: Patient's chronic conditions and co-morbidity symptoms are monitored and maintained or improved  Outcome: Progressing     Problem: ABCDS Injury Assessment  Goal: Absence of physical injury  Outcome: Progressing

## 2023-07-03 NOTE — CARE COORDINATION
Care Management Initial Assessment       RUR:7%  Readmission? No  1st IM letter given? No  1st  letter given: No       07/03/23 1522   Service Assessment   Patient Orientation Alert and Oriented   Cognition Alert   History Provided By Patient   Support Systems Spouse/Significant Other   Patient's Healthcare Decision Maker is: Patient Declined (Legal Next of Kin Remains as Decision Maker)   PCP Verified by CM Yes   Last Visit to PCP Within last 6 months   Prior Functional Level Independent in ADLs/IADLs   Current Functional Level Independent in ADLs/IADLs   Can patient return to prior living arrangement Yes   Ability to make needs known: Good   Family able to assist with home care needs: Yes   Financial Resources Medicare   Social/Functional History   Lives With Spouse   Type of [address] From Family   ADL Assistance Independent   Homemaking Assistance Independent   Ambulation Assistance Independent   Transfer Assistance Independent   Active  Yes   Mode of Transportation Car   Occupation Retired; Other(comment)  (applying for disability)   Discharge Planning   Living Arrangements Spouse/Significant Other   DME Ordered? Other (comment)   Potential Assistance Purchasing Medications No   Patient expects to be discharged to: [place]     CM met with patient at doorway. I introduced self, explained role and offered assistance. He lives with his wife. Uses a walker sometimes, drives. Wife is his support but he is independent. He does not have or want an advance directive. His wife is his HCDM. Patient expects to be discharged home. CM will follow for any needs.     Keira Escobedo  [facility]

## 2023-07-03 NOTE — PLAN OF CARE
Problem: Discharge Planning  Goal: Discharge to home or other facility with appropriate resources  7/3/2023 0837 by Codi Ontiveros, RN  Outcome: Progressing  Flowsheets (Taken 7/3/2023 0803)  Discharge to home or other facility with appropriate resources:   Identify barriers to discharge with patient and caregiver   Arrange for needed discharge resources and transportation as appropriate  7/3/2023 0644 by Kristi Felix RN  Outcome: Progressing  Flowsheets  Taken 7/3/2023 0608  Discharge to home or other facility with appropriate resources:   Identify barriers to discharge with patient and caregiver   Arrange for needed discharge resources and transportation as appropriate   Identify discharge learning needs (meds, wound care, etc)   Arrange for interpreters to assist at discharge as needed   Refer to discharge planning if patient needs post-hospital services based on physician order or complex needs related to functional status, cognitive ability or social support system  Taken 7/3/2023 0534  Discharge to home or other facility with appropriate resources:   Identify barriers to discharge with patient and caregiver   Arrange for needed discharge resources and transportation as appropriate   Identify discharge learning needs (meds, wound care, etc)   Arrange for interpreters to assist at discharge as needed   Refer to discharge planning if patient needs post-hospital services based on physician order or complex needs related to functional status, cognitive ability or social support system     Problem: Chronic Conditions and Co-morbidities  Goal: Patient's chronic conditions and co-morbidity symptoms are monitored and maintained or improved  7/3/2023 0837 by Codi Ontiveros, RN  Outcome: Progressing  Flowsheets (Taken 7/3/2023 0803)  Care Plan - Patient's Chronic Conditions and Co-Morbidity Symptoms are Monitored and Maintained or Improved:   Monitor and assess patient's chronic conditions and comorbid symptoms for

## 2023-07-03 NOTE — CONSULTS
[facility] EP/ ARRHYTHMIA/ CARDIOLOGY CONSULT      EP Cardiology consult requested by Evaristo Roth MD for hypertensive crisis  PCP:  MICHEL Richard - NP    Primary cardiologist: Dr. Maricruz Parker     Electrophysiology Service  7/3/2023     Assessment:   Hypertensive emergency   With sign of end organ damage (heart and kidney)  Stable now with labetalol and nicardipine drip    Non-ST elevation myocardial infarction   Secondary to hypertension  Troponin is flat  Ischemic cardiomyopathy  On GDMT  Repeat Echo shows stable EF 45% with inferior hypokinesis  CAD  Status post inferior ST elevation myocardial infarction now on Brelinta  Acute sigmoid colitis  On antibiotics  Acute on chronic renal failure  Likely secondary to uncontrolled hypertension       Plan:   Stop nicardipine drip and add amlodipine 5 mg daily  Continue lisinopril and metoprolol   Monitor Cr  Can switch to Entresto as outpatient  Echo is stable  Continue abx      [x]    High complexity decision making was performed  [x]    Patient is at high-risk of decompensation with multiple organ involvement       HPI:  Cony Littlejohn is a 63 y.o. male with history of:    CAD s/p Inferior STEMI on 3/7/2022, LHC showed 70-80% prox, occluded distal RCA *(culprit). 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 Was found to be hypertensive with troponin elevation and worsening renal function. Has been taking over the counter ED pills for male performance and thinks it was upseting his GI. Troponin was slightly elevated on presentation. BP is now stabilized. CT abdomen also showed colitis and is now getting antibiotics.           Allergies   Allergen Reactions    Hydrocodone

## 2023-07-04 VITALS
HEIGHT: 71 IN | DIASTOLIC BLOOD PRESSURE: 67 MMHG | SYSTOLIC BLOOD PRESSURE: 113 MMHG | RESPIRATION RATE: 18 BRPM | WEIGHT: 199.96 LBS | BODY MASS INDEX: 27.99 KG/M2 | HEART RATE: 50 BPM | TEMPERATURE: 98.5 F | OXYGEN SATURATION: 98 %

## 2023-07-04 LAB
ALBUMIN SERPL-MCNC: 3.2 G/DL (ref 3.5–5)
ALBUMIN/GLOB SERPL: 0.9 (ref 1.1–2.2)
ALP SERPL-CCNC: 45 U/L (ref 45–117)
ALT SERPL-CCNC: 18 U/L (ref 12–78)
ANION GAP SERPL CALC-SCNC: 4 MMOL/L (ref 5–15)
AST SERPL-CCNC: 20 U/L (ref 15–37)
BASOPHILS # BLD: 0 K/UL (ref 0–0.1)
BASOPHILS NFR BLD: 0 % (ref 0–1)
BILIRUB SERPL-MCNC: 0.5 MG/DL (ref 0.2–1)
BUN SERPL-MCNC: 16 MG/DL (ref 6–20)
BUN/CREAT SERPL: 11 (ref 12–20)
CALCIUM SERPL-MCNC: 8.9 MG/DL (ref 8.5–10.1)
CHLORIDE SERPL-SCNC: 112 MMOL/L (ref 97–108)
CO2 SERPL-SCNC: 22 MMOL/L (ref 21–32)
CREAT SERPL-MCNC: 1.44 MG/DL (ref 0.7–1.3)
DIFFERENTIAL METHOD BLD: ABNORMAL
EKG ATRIAL RATE: 54 BPM
EKG DIAGNOSIS: NORMAL
EKG P AXIS: 118 DEGREES
EKG P-R INTERVAL: 190 MS
EKG Q-T INTERVAL: 600 MS
EKG QRS DURATION: 102 MS
EKG QTC CALCULATION (BAZETT): 569 MS
EKG R AXIS: 218 DEGREES
EKG T AXIS: 206 DEGREES
EKG VENTRICULAR RATE: 54 BPM
EOSINOPHIL # BLD: 0 K/UL (ref 0–0.4)
EOSINOPHIL NFR BLD: 0 % (ref 0–7)
ERYTHROCYTE [DISTWIDTH] IN BLOOD BY AUTOMATED COUNT: 14.6 % (ref 11.5–14.5)
GLOBULIN SER CALC-MCNC: 3.7 G/DL (ref 2–4)
GLUCOSE SERPL-MCNC: 111 MG/DL (ref 65–100)
HCT VFR BLD AUTO: 36.4 % (ref 36.6–50.3)
HGB BLD-MCNC: 11.9 G/DL (ref 12.1–17)
IMM GRANULOCYTES # BLD AUTO: 0 K/UL (ref 0–0.04)
IMM GRANULOCYTES NFR BLD AUTO: 0 % (ref 0–0.5)
LYMPHOCYTES # BLD: 2.7 K/UL (ref 0.8–3.5)
LYMPHOCYTES NFR BLD: 30 % (ref 12–49)
MCH RBC QN AUTO: 29.9 PG (ref 26–34)
MCHC RBC AUTO-ENTMCNC: 32.7 G/DL (ref 30–36.5)
MCV RBC AUTO: 91.5 FL (ref 80–99)
MONOCYTES # BLD: 0.8 K/UL (ref 0–1)
MONOCYTES NFR BLD: 9 % (ref 5–13)
NEUTS SEG # BLD: 5.5 K/UL (ref 1.8–8)
NEUTS SEG NFR BLD: 61 % (ref 32–75)
NRBC # BLD: 0 K/UL (ref 0–0.01)
NRBC BLD-RTO: 0 PER 100 WBC
PLATELET # BLD AUTO: 227 K/UL (ref 150–400)
PMV BLD AUTO: 9.6 FL (ref 8.9–12.9)
POTASSIUM SERPL-SCNC: 4.4 MMOL/L (ref 3.5–5.1)
PROT SERPL-MCNC: 6.9 G/DL (ref 6.4–8.2)
RBC # BLD AUTO: 3.98 M/UL (ref 4.1–5.7)
SODIUM SERPL-SCNC: 138 MMOL/L (ref 136–145)
TROPONIN I SERPL HS-MCNC: 76 NG/L (ref 0–76)
WBC # BLD AUTO: 9 K/UL (ref 4.1–11.1)

## 2023-07-04 PROCEDURE — 6370000000 HC RX 637 (ALT 250 FOR IP): Performed by: INTERNAL MEDICINE

## 2023-07-04 PROCEDURE — 84484 ASSAY OF TROPONIN QUANT: CPT

## 2023-07-04 PROCEDURE — 80053 COMPREHEN METABOLIC PANEL: CPT

## 2023-07-04 PROCEDURE — 2580000003 HC RX 258: Performed by: INTERNAL MEDICINE

## 2023-07-04 PROCEDURE — 87506 IADNA-DNA/RNA PROBE TQ 6-11: CPT

## 2023-07-04 PROCEDURE — 6360000002 HC RX W HCPCS: Performed by: INTERNAL MEDICINE

## 2023-07-04 PROCEDURE — 89055 LEUKOCYTE ASSESSMENT FECAL: CPT

## 2023-07-04 PROCEDURE — 36415 COLL VENOUS BLD VENIPUNCTURE: CPT

## 2023-07-04 PROCEDURE — 85025 COMPLETE CBC W/AUTO DIFF WBC: CPT

## 2023-07-04 RX ORDER — CIPROFLOXACIN 500 MG/1
500 TABLET, FILM COATED ORAL 2 TIMES DAILY
Qty: 6 TABLET | Refills: 0 | Status: SHIPPED | OUTPATIENT
Start: 2023-07-04 | End: 2023-07-04 | Stop reason: HOSPADM

## 2023-07-04 RX ORDER — METRONIDAZOLE 500 MG/1
500 TABLET ORAL 3 TIMES DAILY
Qty: 9 TABLET | Refills: 0 | Status: SHIPPED | OUTPATIENT
Start: 2023-07-04 | End: 2023-07-07

## 2023-07-04 RX ADMIN — METOPROLOL TARTRATE 50 MG: 50 TABLET ORAL at 07:57

## 2023-07-04 RX ADMIN — TICAGRELOR 90 MG: 90 TABLET ORAL at 07:57

## 2023-07-04 RX ADMIN — SODIUM CHLORIDE, PRESERVATIVE FREE 10 ML: 5 INJECTION INTRAVENOUS at 08:01

## 2023-07-04 RX ADMIN — PIPERACILLIN AND TAZOBACTAM 3375 MG: 3; .375 INJECTION, POWDER, LYOPHILIZED, FOR SOLUTION INTRAVENOUS at 05:56

## 2023-07-04 RX ADMIN — POTASSIUM CHLORIDE AND SODIUM CHLORIDE: 900; 300 INJECTION, SOLUTION INTRAVENOUS at 05:55

## 2023-07-04 RX ADMIN — AMLODIPINE BESYLATE 5 MG: 5 TABLET ORAL at 08:00

## 2023-07-04 RX ADMIN — LISINOPRIL 10 MG: 10 TABLET ORAL at 08:00

## 2023-07-04 RX ADMIN — ASPIRIN 81 MG: 81 TABLET, CHEWABLE ORAL at 08:00

## 2023-07-04 NOTE — CARE COORDINATION
No further CM needs identified at this time. Transition of Care Plan:    RUR: 8% \"low risk\"  Prior Level of Functioning: Independent with ADL's  Disposition: Home with follow up appts: Follow up appointments: Pt will schedule own PCP appointment  DME needed: Has a Rolling Walker at home  Transportation at discharge: Wife to provide transport at d/c  IM/IMM Medicare/ letter given: No   Is patient a  and connected with VA? N/A  Caregiver Contact: Pt's spouse Ana Aparicio [phone]  Discharge Caregiver contacted prior to discharge? No  Care Conference needed? Not at this time  Barriers to discharge:  None    Initial note: Chart reviewed for updates. CM acknowledged d/c. No needs identified for this patient. Pt's wife will provide transport at d/c. No further CM needs identified at this time.      07/04/23 [address] [place] [address] [address] Discharge   Transition of Care Consult (CM Consult) N/A   Services At/After Discharge None   Mode of Transport at Discharge Other (see comment)  (Pt's wife will provide transport at d/c)   Confirm Follow Up Transport Family  (Pt's wife will provide transport at d/c)   Condition of Participation: Discharge Planning   The Plan for Transition of Care is related to the following treatment goals: Pt is d/c home with follow up appts     CLIFFORD Joseph    [phone]

## 2023-07-04 NOTE — PROGRESS NOTES

## 2023-07-04 NOTE — PROGRESS NOTES
Hospitalist Progress Note    NAME:   Nik Carlton   : 1960   MRN: [id number]     Date/Time: 7/3/2023 11:21 PM  Patient PCP: MICHEL Roberto NP    Estimated discharge date: 2023    Barriers:  improved K and cardiology clearance    Assessment / Plan:    Acute colitis POA likely infectious  Suspected gastroenteritis POA now resolving  N/V with diarrhea x 1-2 days now resolving  CT abdomen/pelvis with infectious colitis worse involving the sigmoid colon. Started on IV Cipro and Flagyl --> zosyn with prolonged Qtc   Consider stopping in AM if symptoms remain resolved  Gentle IV fluids. Hypokalemia K 2.8  Supplement p.o.and IV  Recheck in AM  Likely Gi losses    Hypertensive urgency POA  Elevated HS troponin 259 POA  CAD POA   Elevated Qtc 0. Secondary to noncompliance with antihypertensives due to severe GI illness/colitis  S/p Cardene gtt, N/V resolved  Home medications, metoprolol and lisinopril, resumed  Continue aspirin and Brilinta, along with metoprolol. Elevated troponin as high as 259 currently   Likely secondary to demand ischemia from hypertensive urgency. Last cardiac cath 2022 for NSTEMI   Normal LM   LAD with 60-70% LAD    70% D1   Left Circ 50-60% ostial    OM1 70-80%    OM2 80-90%   RCA 70-80% proximal, thrombotic occlusion distal RCA\     Prox and distal PCA stents  Current Echo TTE 7/3  LVEF 45-50% inferior HK, normal RV function   Trace MR  Cardiology consultation following      Ectatic ascending thoracic aorta  Measuring 4.4 cm. No complications including dissection, hematoma or penetrating ulcer noted. DVT prophylaxis Lovenox SC.      Full CODE STATUS    Medical Decision Making:   I personally reviewed labs:CBC, BMP  I personally reviewed imaging: CT abdomen/pelvis  I personally reviewed EKG: yes  Toxic drug monitoring:   Discussed case with: Patient, NS and case management on IDRs    Subjective:     Chief Complaint / Reason for Physician Visit  \"The

## 2023-07-04 NOTE — PLAN OF CARE
Problem: Discharge Planning  Goal: Discharge to home or other facility with appropriate resources  Outcome: Progressing  Flowsheets (Taken 7/4/2023 0720)  Discharge to home or other facility with appropriate resources: Identify barriers to discharge with patient and caregiver     Problem: Chronic Conditions and Co-morbidities  Goal: Patient's chronic conditions and co-morbidity symptoms are monitored and maintained or improved  Outcome: Progressing  Flowsheets (Taken 7/4/2023 0720)  Care Plan - Patient's Chronic Conditions and Co-Morbidity Symptoms are Monitored and Maintained or Improved: Monitor and assess patient's chronic conditions and comorbid symptoms for stability, deterioration, or improvement     Problem: ABCDS Injury Assessment  Goal: Absence of physical injury  Outcome: Progressing

## 2023-07-04 NOTE — DISCHARGE SUMMARY
times daily for 3 days     sacubitril-valsartan 24-26 MG per tablet  Commonly known as: ENTRESTO  Take 1 tablet by mouth 2 times daily            CONTINUE taking these medications      amLODIPine 10 MG tablet  Commonly known as: NORVASC     [facility] Acetaminophen Ex St 500 MG tablet  Generic drug: acetaminophen  TAKE 1 TABLET BY MOUTH EVERY 6 HOURS AS NEEDED FOR PAIN     [facility] Aspirin Adult Low Dose 81 MG chewable tablet  Generic drug: aspirin  TAKE 1 TABLET BY MOUTH EVERY DAY     diclofenac sodium 1 % Gel  Commonly known as: VOLTAREN     lidocaine 5 %  Commonly known as: LIDODERM     metoprolol tartrate 50 MG tablet  Commonly known as: LOPRESSOR     rosuvastatin 10 MG tablet  Commonly known as: CRESTOR     ticagrelor 90 MG Tabs tablet  Commonly known as: BRILINTA            STOP taking these medications      lisinopril 10 MG tablet  Commonly known as: PRINIVIL;ZESTRIL               Where to Get Your Medications        These medications were sent to [facility]/pharmacy #[id number] [facility], [address] [place] [id number] [facility] [phone]  [place] [address]      Phone: [phone]   metroNIDAZOLE 500 MG tablet  sacubitril-valsartan 24-26 MG per tablet             DISPOSITION:    Home with Family:    x   Home with HH/PT/OT/RN:    SNF/LTC:    RODNEY:    OTHER:            Code status: Full  Recommended diet: regular diet  Recommended activity: activity as tolerated  Wound care: None      Follow up with:   PCP : MICHEL Schulz - SARANYA Nunez MD  [address] Rachna Ling [address] [place] [address] [phone]    Schedule an appointment as soon as possible for a visit in 1 month(s)      Uli Andres MD  [address]  [phone]    Follow up      MICHEL Dailey NP  [address]  [address] [place] [address]  [phone]                Total time in minutes spent coordinating this discharge (includes going over

## 2023-07-05 LAB
C COLI+JEJUNI TUF STL QL NAA+PROBE: NEGATIVE
EC STX1+STX2 GENES STL QL NAA+PROBE: NEGATIVE
ETEC ELTA+ESTB GENES STL QL NAA+PROBE: NEGATIVE
P SHIGELLOIDES DNA STL QL NAA+PROBE: NEGATIVE
SALMONELLA SP SPAO STL QL NAA+PROBE: NEGATIVE
SHIGELLA SP+EIEC IPAH STL QL NAA+PROBE: NEGATIVE
V CHOL+PARA+VUL DNA STL QL NAA+NON-PROBE: NEGATIVE
WBC #/AREA STL HPF: NORMAL /HPF (ref 0–4)
Y ENTEROCOL DNA STL QL NAA+NON-PROBE: NEGATIVE

## 2023-07-05 NOTE — PROGRESS NOTES
Physician Progress Note      PATIENTGail Leventhal ERIC  [facility] #:                  [id number]  :                       1960  ADMIT DATE:       2023 2:20 PM  [facility] DATE:        2023 2:31 PM  RESPONDING  PROVIDER #:        Saran Darling MD          QUERY TEXT:    Patient admitted with hypertensive urgency, acute colitis, and NSTEMI. Noted   documentation of \"acute on chronic renal failure\" in 7/3 Cardiology consult. In order to support the diagnosis of ROBERT, please include additional clinical   indicators in your documentation, or please document if the diagnosis of ROBERT   has been ruled out after further study. The medical record reflects the following:  Risk Factors: acute illness, colitis, CKD  Clinical Indicators: admitted with hypertensive urgency, acute colitis, and   NSTEMI  - SCr 1.33 on admission and 1.44 on   - 7/3 Card consult notes acute on chronic renal failure  Treatment: monitoring    Defined by Kidney Disease Improving Global Outcomes (KDIGO) clinical practice   guideline for acute kidney injury:  -Increase in SCr by greater than or equal to 0.3 mg/dl within 48 hours; or  -Increase or decrease in SCr to greater than or equal to 1.5 times baseline,   which is known or presumed to have occurred within the prior 7 days; or  -Urine volume < 0.5ml/kg/h for 6 hours. Thank you,    Tahmina Anders RN  CDI  Options provided:  -- Acute kidney injury evidenced by, Please document evidence as well as a   numerical baseline creatinine, if known. -- Acute kidney injury ruled out after study  -- Other - I will add my own diagnosis  -- Disagree - Not applicable / Not valid  -- Disagree - Clinically unable to determine / Unknown  -- Refer to Clinical Documentation Reviewer    PROVIDER RESPONSE TEXT:    Acute kidney injury was ruled out after study.     Query created by: Tahmina Anders on 2023 3:38 PM      Electronically signed by:  Saran Darling MD 2023 3:43 PM

## 2023-08-03 DIAGNOSIS — M25.561 PAIN IN RIGHT KNEE: ICD-10-CM

## 2023-08-03 DIAGNOSIS — I10 ESSENTIAL (PRIMARY) HYPERTENSION: ICD-10-CM

## 2023-08-03 RX ORDER — AMLODIPINE BESYLATE 10 MG/1
TABLET ORAL
Qty: 90 TABLET | Refills: 0 | Status: SHIPPED | OUTPATIENT
Start: 2023-08-03

## 2023-08-03 RX ORDER — TICAGRELOR 90 MG/1
TABLET ORAL
Qty: 180 TABLET | Refills: 0 | Status: SHIPPED | OUTPATIENT
Start: 2023-08-03

## 2023-08-03 RX ORDER — LIDOCAINE 50 MG/G
PATCH TOPICAL
Qty: 30 PATCH | Refills: 1 | Status: SHIPPED | OUTPATIENT
Start: 2023-08-03

## 2023-09-11 ENCOUNTER — TELEPHONE (OUTPATIENT)
Facility: CLINIC | Age: 63
End: 2023-09-11

## 2023-09-11 NOTE — TELEPHONE ENCOUNTER
Pt's wife is calling to check on status of rx refill for Entresto. Please send to  [facility] pharmacy on [address].   Pt #[phone]

## 2023-09-14 NOTE — TELEPHONE ENCOUNTER
Pt is calling back to check on status of refill request for [facility]  Pt # [phone]

## 2023-09-15 ENCOUNTER — TELEMEDICINE (OUTPATIENT)
Facility: CLINIC | Age: 63
End: 2023-09-15
Payer: MEDICAID

## 2023-09-15 DIAGNOSIS — N18.2 STAGE 2 CHRONIC KIDNEY DISEASE: ICD-10-CM

## 2023-09-15 DIAGNOSIS — I25.2 HISTORY OF NON-ST ELEVATION MYOCARDIAL INFARCTION (NSTEMI): Primary | ICD-10-CM

## 2023-09-15 DIAGNOSIS — I10 ESSENTIAL (PRIMARY) HYPERTENSION: ICD-10-CM

## 2023-09-15 PROCEDURE — 99213 OFFICE O/P EST LOW 20 MIN: CPT | Performed by: NURSE PRACTITIONER

## 2023-09-15 RX ORDER — AMLODIPINE BESYLATE 10 MG/1
TABLET ORAL
Qty: 90 TABLET | Refills: 0 | Status: SHIPPED | OUTPATIENT
Start: 2023-09-15

## 2023-09-15 RX ORDER — METOPROLOL TARTRATE 50 MG/1
50 TABLET, FILM COATED ORAL EVERY 12 HOURS
Qty: 180 TABLET | Refills: 0 | Status: SHIPPED | OUTPATIENT
Start: 2023-09-15

## 2023-09-15 RX ORDER — ROSUVASTATIN CALCIUM 10 MG/1
10 TABLET, COATED ORAL NIGHTLY
Qty: 90 TABLET | Refills: 0 | Status: SHIPPED | OUTPATIENT
Start: 2023-09-15

## 2023-09-15 SDOH — ECONOMIC STABILITY: INCOME INSECURITY: HOW HARD IS IT FOR YOU TO PAY FOR THE VERY BASICS LIKE FOOD, HOUSING, MEDICAL CARE, AND HEATING?: NOT HARD AT ALL

## 2023-09-15 SDOH — ECONOMIC STABILITY: FOOD INSECURITY: WITHIN THE PAST 12 MONTHS, YOU WORRIED THAT YOUR FOOD WOULD RUN OUT BEFORE YOU GOT MONEY TO BUY MORE.: NEVER TRUE

## 2023-09-15 SDOH — ECONOMIC STABILITY: FOOD INSECURITY: WITHIN THE PAST 12 MONTHS, THE FOOD YOU BOUGHT JUST DIDN'T LAST AND YOU DIDN'T HAVE MONEY TO GET MORE.: NEVER TRUE

## 2023-09-15 SDOH — ECONOMIC STABILITY: HOUSING INSECURITY
IN THE LAST 12 MONTHS, WAS THERE A TIME WHEN YOU DID NOT HAVE A STEADY PLACE TO SLEEP OR SLEPT IN A SHELTER (INCLUDING NOW)?: NO

## 2023-09-15 ASSESSMENT — PATIENT HEALTH QUESTIONNAIRE - PHQ9
SUM OF ALL RESPONSES TO PHQ QUESTIONS 1-9: 15
7. TROUBLE CONCENTRATING ON THINGS, SUCH AS READING THE NEWSPAPER OR WATCHING TELEVISION: 1
SUM OF ALL RESPONSES TO PHQ9 QUESTIONS 1 & 2: 3
5. POOR APPETITE OR OVEREATING: 3
4. FEELING TIRED OR HAVING LITTLE ENERGY: 3
6. FEELING BAD ABOUT YOURSELF - OR THAT YOU ARE A FAILURE OR HAVE LET YOURSELF OR YOUR FAMILY DOWN: 2
3. TROUBLE FALLING OR STAYING ASLEEP: 3
SUM OF ALL RESPONSES TO PHQ QUESTIONS 1-9: 15
9. THOUGHTS THAT YOU WOULD BE BETTER OFF DEAD, OR OF HURTING YOURSELF: 0
SUM OF ALL RESPONSES TO PHQ QUESTIONS 1-9: 15
10. IF YOU CHECKED OFF ANY PROBLEMS, HOW DIFFICULT HAVE THESE PROBLEMS MADE IT FOR YOU TO DO YOUR WORK, TAKE CARE OF THINGS AT HOME, OR GET ALONG WITH OTHER PEOPLE: 2
8. MOVING OR SPEAKING SO SLOWLY THAT OTHER PEOPLE COULD HAVE NOTICED. OR THE OPPOSITE, BEING SO FIGETY OR RESTLESS THAT YOU HAVE BEEN MOVING AROUND A LOT MORE THAN USUAL: 0
2. FEELING DOWN, DEPRESSED OR HOPELESS: 3
SUM OF ALL RESPONSES TO PHQ QUESTIONS 1-9: 15
1. LITTLE INTEREST OR PLEASURE IN DOING THINGS: 0

## 2023-09-15 NOTE — PROGRESS NOTES
Documentation:  I communicated with the patient and/or health care decision maker about  risks of not having routine fu. Details of this discussion including any medical advice provided:     Recc home BP check, close fu  Needs to est w cardio  He may not be able to be seen in this office due to insurance, discussed importance of fu      1. History of non-ST elevation myocardial infarction (NSTEMI)    - [facility] - Katrin Cooper MD, Cardiology, [facility] ([address])    2. Stage 2 chronic kidney disease      3. Essential (primary) hypertension    - amLODIPine (NORVASC) 10 MG tablet; TAKE 1 TABLET BY MOUTH EVERY DAY (STOP HYDROCHLOROTHIAZIDE)  Dispense: 90 tablet; Refill: 0            Total Time: minutes: 21-30 minutes      He has not been seen in a year- since last visit he had a NSTEMI  He has not been following w cardiology  He is out meds  He was seen in the ER in July for colitis- BP was very uncontrolled -   GI s/s better  He has not checked home BP    Hypertension ROS:  not taking medications regularly as instructed, no medication side effects noted, no TIAs, no chest pain on exertion, no dyspnea on exertion, no swelling of ankles    Tangential speech    Lucrecia Harding was evaluated through a synchronous (real-time) audio encounter. Patient identification was verified at the start of the visit. He (or guardian if applicable) is aware that this is a billable service, which includes applicable co-pays. This visit was conducted with the patient's (and/or legal guardian's) verbal consent. He has not had a related appointment within my department in the past 7 days or scheduled within the next 24 hours. The patient was located at Home: [address] [place] [address]. The provider was located at Home ([address] [place] [address]): [place].     Note: not billable if this call serves to triage the patient into an appointment for the relevant concern    Lucrecia Harding is a 63 y.o. male evaluated via telephone on 9/15/2023 for

## 2023-12-22 DIAGNOSIS — I25.2 HISTORY OF NON-ST ELEVATION MYOCARDIAL INFARCTION (NSTEMI): Primary | ICD-10-CM

## 2023-12-28 RX ORDER — METOPROLOL TARTRATE 50 MG/1
TABLET, FILM COATED ORAL
Qty: 60 TABLET | Refills: 2 | OUTPATIENT
Start: 2023-12-28

## 2023-12-28 RX ORDER — SACUBITRIL AND VALSARTAN 24; 26 MG/1; MG/1
1 TABLET, FILM COATED ORAL 2 TIMES DAILY
Qty: 60 TABLET | Refills: 2 | OUTPATIENT
Start: 2023-12-28

## 2023-12-28 RX ORDER — METOPROLOL TARTRATE 50 MG/1
50 TABLET, FILM COATED ORAL EVERY 12 HOURS
Qty: 180 TABLET | Refills: 0 | Status: SHIPPED | OUTPATIENT
Start: 2023-12-28

## 2024-02-22 DIAGNOSIS — I10 ESSENTIAL (PRIMARY) HYPERTENSION: ICD-10-CM

## 2024-02-22 RX ORDER — TICAGRELOR 90 MG/1
TABLET ORAL
Qty: 180 TABLET | Refills: 0 | Status: SHIPPED | OUTPATIENT
Start: 2024-02-22

## 2024-02-22 RX ORDER — ROSUVASTATIN CALCIUM 10 MG/1
10 TABLET, COATED ORAL NIGHTLY
Qty: 90 TABLET | Refills: 0 | Status: SHIPPED | OUTPATIENT
Start: 2024-02-22

## 2024-02-22 RX ORDER — AMLODIPINE BESYLATE 10 MG/1
TABLET ORAL
Qty: 90 TABLET | Refills: 0 | Status: SHIPPED | OUTPATIENT
Start: 2024-02-22

## 2024-04-11 RX ORDER — METOPROLOL TARTRATE 50 MG/1
TABLET, FILM COATED ORAL
Qty: 180 TABLET | Refills: 0 | Status: SHIPPED | OUTPATIENT
Start: 2024-04-11

## 2024-04-11 NOTE — TELEPHONE ENCOUNTER
Last appointment: 09/15/2023 Virtual visit SARANYA Short   Next appointment: Nothing scheduled   Previous refill encounter(s):   12/28/2023 Lopressor #180     For Pharmacy Admin Tracking Only    Program: Medication Refill  Intervention Detail: New Rx: 1, reason: Patient Preference  Time Spent (min): 5    Requested Prescriptions     Pending Prescriptions Disp Refills    metoprolol tartrate (LOPRESSOR) 50 MG tablet [Pharmacy Med Name: METOPROLOL TARTRATE 50 MG TAB] 180 tablet 0     Sig: TAKE 1 TABLET BY MOUTH IN THE MORNING AND IN THE EVENING

## 2024-05-13 DIAGNOSIS — I10 ESSENTIAL (PRIMARY) HYPERTENSION: ICD-10-CM

## 2024-05-14 RX ORDER — SACUBITRIL AND VALSARTAN 24; 26 MG/1; MG/1
1 TABLET, FILM COATED ORAL 2 TIMES DAILY
Qty: 60 TABLET | Refills: 2 | Status: SHIPPED | OUTPATIENT
Start: 2024-05-14

## 2024-05-14 RX ORDER — AMLODIPINE BESYLATE 10 MG/1
TABLET ORAL
Qty: 90 TABLET | Refills: 0 | Status: SHIPPED | OUTPATIENT
Start: 2024-05-14

## 2024-05-14 RX ORDER — ROSUVASTATIN CALCIUM 10 MG/1
10 TABLET, COATED ORAL NIGHTLY
Qty: 90 TABLET | Refills: 0 | Status: SHIPPED | OUTPATIENT
Start: 2024-05-14

## 2024-05-14 RX ORDER — TICAGRELOR 90 MG/1
TABLET ORAL
Qty: 180 TABLET | Refills: 0 | Status: SHIPPED | OUTPATIENT
Start: 2024-05-14

## 2024-07-16 DIAGNOSIS — I10 ESSENTIAL (PRIMARY) HYPERTENSION: ICD-10-CM

## 2024-07-16 RX ORDER — LORATADINE 10 MG
TABLET ORAL
Qty: 30 TABLET | Refills: 11 | Status: SHIPPED | OUTPATIENT
Start: 2024-07-16

## 2024-07-16 RX ORDER — METOPROLOL TARTRATE 50 MG/1
TABLET, FILM COATED ORAL
Qty: 180 TABLET | Refills: 0 | Status: SHIPPED | OUTPATIENT
Start: 2024-07-16

## 2024-07-22 RX ORDER — ROSUVASTATIN CALCIUM 10 MG/1
10 TABLET, COATED ORAL NIGHTLY
Qty: 90 TABLET | Refills: 0 | Status: SHIPPED | OUTPATIENT
Start: 2024-07-22

## 2024-07-22 RX ORDER — TICAGRELOR 90 MG/1
TABLET ORAL
Qty: 180 TABLET | Refills: 0 | Status: SHIPPED | OUTPATIENT
Start: 2024-07-22

## 2024-07-22 RX ORDER — AMLODIPINE BESYLATE 10 MG/1
TABLET ORAL
Qty: 90 TABLET | Refills: 0 | Status: SHIPPED | OUTPATIENT
Start: 2024-07-22

## 2024-09-04 RX ORDER — SACUBITRIL AND VALSARTAN 24; 26 MG/1; MG/1
1 TABLET, FILM COATED ORAL 2 TIMES DAILY
Qty: 60 TABLET | Refills: 0 | Status: SHIPPED | OUTPATIENT
Start: 2024-09-04

## 2024-10-05 DIAGNOSIS — M25.561 PAIN IN RIGHT KNEE: ICD-10-CM

## 2024-10-05 DIAGNOSIS — I10 ESSENTIAL (PRIMARY) HYPERTENSION: ICD-10-CM

## 2024-10-07 RX ORDER — METOPROLOL TARTRATE 50 MG
TABLET ORAL
Qty: 180 TABLET | Refills: 0 | Status: SHIPPED | OUTPATIENT
Start: 2024-10-07

## 2024-10-07 RX ORDER — ACETAMINOPHEN 500 MG/1
500 TABLET ORAL EVERY 6 HOURS PRN
Qty: 60 TABLET | Refills: 0 | Status: SHIPPED | OUTPATIENT
Start: 2024-10-07

## 2024-10-07 RX ORDER — SACUBITRIL AND VALSARTAN 24; 26 MG/1; MG/1
1 TABLET, FILM COATED ORAL 2 TIMES DAILY
Qty: 60 TABLET | Refills: 0 | Status: SHIPPED | OUTPATIENT
Start: 2024-10-07

## 2024-10-07 RX ORDER — AMLODIPINE BESYLATE 10 MG/1
TABLET ORAL
Qty: 90 TABLET | Refills: 0 | Status: SHIPPED | OUTPATIENT
Start: 2024-10-07

## 2024-12-29 DIAGNOSIS — M25.561 PAIN IN RIGHT KNEE: ICD-10-CM

## 2024-12-30 RX ORDER — ACETAMINOPHEN 500 MG/1
500 TABLET ORAL EVERY 6 HOURS PRN
Qty: 60 TABLET | Refills: 0 | OUTPATIENT
Start: 2024-12-30

## 2024-12-30 RX ORDER — ROSUVASTATIN CALCIUM 10 MG/1
10 TABLET, COATED ORAL NIGHTLY
Qty: 90 TABLET | Refills: 0 | OUTPATIENT
Start: 2024-12-30

## 2024-12-30 RX ORDER — SACUBITRIL AND VALSARTAN 24; 26 MG/1; MG/1
1 TABLET, FILM COATED ORAL 2 TIMES DAILY
Qty: 60 TABLET | Refills: 0 | OUTPATIENT
Start: 2024-12-30

## 2024-12-30 RX ORDER — METOPROLOL TARTRATE 50 MG
TABLET ORAL
Qty: 180 TABLET | Refills: 0 | OUTPATIENT
Start: 2024-12-30

## 2024-12-30 RX ORDER — TICAGRELOR 90 MG/1
TABLET ORAL
Qty: 180 TABLET | Refills: 0 | OUTPATIENT
Start: 2024-12-30

## 2025-01-03 NOTE — TELEPHONE ENCOUNTER
Linnea Trevino left a voice message requesting a return call regarding the reason SARANYA Short will not refill pt's meds.     [phone] or [phone]    Saranya Short denied requests on 12/29/2024 due to pt needing an office visit.     For Pharmacy Admin Tracking Only    Program: Medication Refill  Intervention Detail: New Rx: 1, reason: Patient Preference  Time Spent (min): 5

## 2025-02-10 DIAGNOSIS — I10 ESSENTIAL (PRIMARY) HYPERTENSION: ICD-10-CM

## 2025-02-10 RX ORDER — AMLODIPINE BESYLATE 10 MG/1
TABLET ORAL
Qty: 90 TABLET | Refills: 0 | Status: SHIPPED | OUTPATIENT
Start: 2025-02-10

## 2025-02-12 DIAGNOSIS — M25.561 PAIN IN RIGHT KNEE: ICD-10-CM

## 2025-02-13 RX ORDER — ACETAMINOPHEN 500 MG/1
500 TABLET ORAL EVERY 6 HOURS PRN
Qty: 60 TABLET | Refills: 0 | OUTPATIENT
Start: 2025-02-13

## 2025-05-11 DIAGNOSIS — I10 ESSENTIAL (PRIMARY) HYPERTENSION: ICD-10-CM

## 2025-05-12 RX ORDER — AMLODIPINE BESYLATE 10 MG/1
TABLET ORAL
Qty: 90 TABLET | Refills: 0 | OUTPATIENT
Start: 2025-05-12

## (undated) DEVICE — HI-TORQUE PILOT 150 GUIDE WIRE .014 STRAIGHT TIP 3.0 CM X 300 CM: Brand: HI-TORQUE PILOT

## (undated) DEVICE — RUNTHROUGH NS EXTRA FLOPPY PTCA GUIDEWIRE: Brand: RUNTHROUGH

## (undated) DEVICE — CATHETER ETER ANGIO L110CM OD5FR ID046IN L75CM 038IN 145DEG CARD

## (undated) DEVICE — TUBING PRSS MON L6IN PVC M FEM CONN

## (undated) DEVICE — 3M™ TEGADERM™ TRANSPARENT FILM DRESSING FRAME STYLE, 1626W, 4 IN X 4-3/4 IN (10 CM X 12 CM), 50/CT 4CT/CASE: Brand: 3M™ TEGADERM™

## (undated) DEVICE — PRESSURE GUIDEWIRE: Brand: COMET™ II

## (undated) DEVICE — GUIDEWIRE VASC L260CM 0.035IN J TIP L3MM PTFE FIX COR NAMIC

## (undated) DEVICE — COPILOT BLEEDBACK CONTROL VALVE: Brand: COPILOT

## (undated) DEVICE — RADIFOCUS OPTITORQUE ANGIOGRAPHIC CATHETER: Brand: OPTITORQUE

## (undated) DEVICE — HI-TORQUE VERSACORE FLOPPY GUIDE WIRE SYSTEM 145 CM: Brand: HI-TORQUE VERSACORE

## (undated) DEVICE — SPLINT WR POS F/ARTERIAL ACC -- BX/10

## (undated) DEVICE — MINI TREK CORONARY DILATATION CATHETER 2.0 MM X 12 MM / RAPID-EXCHANGE: Brand: MINI TREK

## (undated) DEVICE — GEC TELE 6F TELESCOPE --

## (undated) DEVICE — CATH GUID COR AL75 6FR 100CM -- LAUNCHER

## (undated) DEVICE — GLIDESHEATH SLENDER ACCESS KIT: Brand: GLIDESHEATH SLENDER

## (undated) DEVICE — Device: Brand: ASAHI CORSAIR PRO XS

## (undated) DEVICE — MEDI-TRACE CADENCE ADULT, DEFIBRILLATION ELECTRODE -RTS  (10 PR/PK) - PHILIPS: Brand: MEDI-TRACE CADENCE

## (undated) DEVICE — CATH TRAPPER EXCHANGE --

## (undated) DEVICE — CATH 5F 100CM JL35 -- DXTERITY

## (undated) DEVICE — Z DUPLICATE USE 2103554 VALVE HEMOSTATIC BLEEDBK CTRL COPILOT

## (undated) DEVICE — BAND COMPR L24CM REG CLR PLAS HEMSTAT EXT HK AND LOOP RETEN

## (undated) DEVICE — CATH ANGI BLLN DIL 3.0X12MM -- NC EUPHORA

## (undated) DEVICE — CUSTOM KT PTCA INFL DEV K05 00053H